# Patient Record
Sex: FEMALE | Race: WHITE | NOT HISPANIC OR LATINO | Employment: PART TIME | URBAN - METROPOLITAN AREA
[De-identification: names, ages, dates, MRNs, and addresses within clinical notes are randomized per-mention and may not be internally consistent; named-entity substitution may affect disease eponyms.]

---

## 2018-09-18 LAB
EXTERNAL HEPATITIS B SURFACE ANTIGEN: NEGATIVE
EXTERNAL SYPHILIS RPR SCREEN: NORMAL
HIV1 P24 AG SERPL QL IA: NEGATIVE

## 2018-10-03 LAB — EXTERNAL GROUP B STREP ANTIGEN: NORMAL

## 2019-02-07 ENCOUNTER — HOSPITAL ENCOUNTER (EMERGENCY)
Facility: HOSPITAL | Age: 29
Discharge: HOME | End: 2019-02-07
Attending: EMERGENCY MEDICINE | Admitting: EMERGENCY MEDICINE

## 2019-02-07 ENCOUNTER — APPOINTMENT (OUTPATIENT)
Dept: GENERAL RADIOLOGY | Facility: HOSPITAL | Age: 29
End: 2019-02-07

## 2019-02-07 VITALS
TEMPERATURE: 98 F | HEART RATE: 85 BPM | HEIGHT: 64 IN | SYSTOLIC BLOOD PRESSURE: 107 MMHG | OXYGEN SATURATION: 98 % | WEIGHT: 140 LBS | DIASTOLIC BLOOD PRESSURE: 75 MMHG | BODY MASS INDEX: 23.9 KG/M2 | RESPIRATION RATE: 20 BRPM

## 2019-02-07 DIAGNOSIS — R06.02 SHORTNESS OF BREATH: Primary | ICD-10-CM

## 2019-02-07 LAB
ALBUMIN SERPL-MCNC: 4 G/DL (ref 3.5–5.2)
ALBUMIN/GLOB SERPL: 1.3 G/DL
ALP SERPL-CCNC: 53 U/L (ref 39–117)
ALT SERPL W P-5'-P-CCNC: 23 U/L (ref 1–33)
ANION GAP SERPL CALCULATED.3IONS-SCNC: 12.2 MMOL/L
AST SERPL-CCNC: 17 U/L (ref 1–32)
BASOPHILS # BLD AUTO: 0.04 10*3/MM3 (ref 0–0.2)
BASOPHILS NFR BLD AUTO: 0.3 % (ref 0–1.5)
BILIRUB SERPL-MCNC: <0.2 MG/DL (ref 0.1–1.2)
BILIRUB UR QL STRIP: NEGATIVE
BUN BLD-MCNC: 6 MG/DL (ref 6–20)
BUN/CREAT SERPL: 12.8 (ref 7–25)
CALCIUM SPEC-SCNC: 9.2 MG/DL (ref 8.6–10.5)
CHLORIDE SERPL-SCNC: 102 MMOL/L (ref 98–107)
CLARITY UR: CLEAR
CO2 SERPL-SCNC: 23.8 MMOL/L (ref 22–29)
COLOR UR: YELLOW
CREAT BLD-MCNC: 0.47 MG/DL (ref 0.57–1)
D DIMER PPP FEU-MCNC: 0.9 MCGFEU/ML (ref 0–0.49)
DEPRECATED RDW RBC AUTO: 49.7 FL (ref 37–54)
EOSINOPHIL # BLD AUTO: 0.16 10*3/MM3 (ref 0–0.7)
EOSINOPHIL NFR BLD AUTO: 1.1 % (ref 0.3–6.2)
ERYTHROCYTE [DISTWIDTH] IN BLOOD BY AUTOMATED COUNT: 14 % (ref 11.7–13)
GFR SERPL CREATININE-BSD FRML MDRD: >150 ML/MIN/1.73
GLOBULIN UR ELPH-MCNC: 3.2 GM/DL
GLUCOSE BLD-MCNC: 84 MG/DL (ref 65–99)
GLUCOSE UR STRIP-MCNC: NEGATIVE MG/DL
HCT VFR BLD AUTO: 35.6 % (ref 35.6–45.5)
HGB BLD-MCNC: 11.7 G/DL (ref 11.9–15.5)
HGB UR QL STRIP.AUTO: NEGATIVE
IMM GRANULOCYTES # BLD AUTO: 0.11 10*3/MM3 (ref 0–0.03)
IMM GRANULOCYTES NFR BLD AUTO: 0.8 % (ref 0–0.5)
KETONES UR QL STRIP: NEGATIVE
LEUKOCYTE ESTERASE UR QL STRIP.AUTO: NEGATIVE
LYMPHOCYTES # BLD AUTO: 2.27 10*3/MM3 (ref 0.9–4.8)
LYMPHOCYTES NFR BLD AUTO: 15.8 % (ref 19.6–45.3)
MCH RBC QN AUTO: 32.1 PG (ref 26.9–32)
MCHC RBC AUTO-ENTMCNC: 32.9 G/DL (ref 32.4–36.3)
MCV RBC AUTO: 97.5 FL (ref 80.5–98.2)
MONOCYTES # BLD AUTO: 0.81 10*3/MM3 (ref 0.2–1.2)
MONOCYTES NFR BLD AUTO: 5.6 % (ref 5–12)
NEUTROPHILS # BLD AUTO: 10.97 10*3/MM3 (ref 1.9–8.1)
NEUTROPHILS NFR BLD AUTO: 76.4 % (ref 42.7–76)
NITRITE UR QL STRIP: NEGATIVE
PH UR STRIP.AUTO: 7.5 [PH] (ref 5–8)
PLATELET # BLD AUTO: 332 10*3/MM3 (ref 140–500)
PMV BLD AUTO: 10.1 FL (ref 6–12)
POTASSIUM BLD-SCNC: 4 MMOL/L (ref 3.5–5.2)
PROT SERPL-MCNC: 7.2 G/DL (ref 6–8.5)
PROT UR QL STRIP: NEGATIVE
RBC # BLD AUTO: 3.65 10*6/MM3 (ref 3.9–5.2)
SODIUM BLD-SCNC: 138 MMOL/L (ref 136–145)
SP GR UR STRIP: <=1.005 (ref 1–1.03)
UROBILINOGEN UR QL STRIP: NORMAL
WBC NRBC COR # BLD: 14.36 10*3/MM3 (ref 4.5–10.7)

## 2019-02-07 PROCEDURE — 99284 EMERGENCY DEPT VISIT MOD MDM: CPT

## 2019-02-07 PROCEDURE — 85379 FIBRIN DEGRADATION QUANT: CPT | Performed by: NURSE PRACTITIONER

## 2019-02-07 PROCEDURE — 85025 COMPLETE CBC W/AUTO DIFF WBC: CPT | Performed by: NURSE PRACTITIONER

## 2019-02-07 PROCEDURE — 81003 URINALYSIS AUTO W/O SCOPE: CPT | Performed by: NURSE PRACTITIONER

## 2019-02-07 PROCEDURE — 93005 ELECTROCARDIOGRAM TRACING: CPT | Performed by: EMERGENCY MEDICINE

## 2019-02-07 PROCEDURE — 80053 COMPREHEN METABOLIC PANEL: CPT | Performed by: NURSE PRACTITIONER

## 2019-02-07 PROCEDURE — 71045 X-RAY EXAM CHEST 1 VIEW: CPT

## 2019-02-07 NOTE — ED PROVIDER NOTES
EMERGENCY DEPARTMENT ENCOUNTER    CHIEF COMPLAINT  Chief Complaint: SOA  History given by:Pt  History limited by:None  Time Seen: 4:05 PM  Room Number: 43/43  PMD: Provider, No Known  OB/GYN Dr. Jones    HPI:  Pt is a 29 y.o. female who is 26 weeks pregnant (A0) presents with worsening SOA for 2 week. Pt c/o feeling like she can't get a deep breath. She also c/o mild cough but denies CP, fever, nausea, vomiting, recent travel, leg swelling/pain, and previous complications during her pregnancy. Pt contacted her OB Dr. Jones and they advised her to come to the ED for further evaluation.     Duration: 2 week  Timing:constant   Location:respiratory  Quality:short of breath  Intensity/Severity:moderate  Progression:worsening  Associated Symptoms:mild coug  Previous Episodes: none  Treatment before arrival:none    PAST MEDICAL HISTORY  Active Ambulatory Problems     Diagnosis Date Noted   • Bilateral hip pain 2016     Resolved Ambulatory Problems     Diagnosis Date Noted   • No Resolved Ambulatory Problems     Past Medical History:   Diagnosis Date   • Heart murmur    • Irritable bowel syndrome    • Seasonal allergies        PAST SURGICAL HISTORY  Past Surgical History:   Procedure Laterality Date   • KNEE ARTHROSCOPY         FAMILY HISTORY  Family History   Problem Relation Age of Onset   • Hypertension Mother    • Diabetes Mother    • Lung disease Mother    • Heart disease Mother    • Cancer Paternal Uncle    • Clotting disorder Maternal Grandmother    • Cancer Maternal Grandfather    • Hypertension Maternal Grandfather    • Diabetes Paternal Grandmother    • Lung disease Paternal Grandmother    • Heart disease Paternal Grandmother    • Cancer Paternal Grandfather    • Hypertension Paternal Grandfather        SOCIAL HISTORY  Social History     Socioeconomic History   • Marital status:      Spouse name: Not on file   • Number of children: Not on file   • Years of education: Not on file   • Highest  education level: Not on file   Social Needs   • Financial resource strain: Not on file   • Food insecurity - worry: Not on file   • Food insecurity - inability: Not on file   • Transportation needs - medical: Not on file   • Transportation needs - non-medical: Not on file   Occupational History   • Not on file   Tobacco Use   • Smoking status: Never Smoker   • Smokeless tobacco: Never Used   Substance and Sexual Activity   • Alcohol use: Yes     Comment: social   • Drug use: No   • Sexual activity: Not on file   Other Topics Concern   • Not on file   Social History Narrative   • Not on file         ALLERGIES  Codeine; Latex; Levaquin [levofloxacin]; and Sulfa antibiotics    REVIEW OF SYSTEMS  Review of Systems   Constitutional: Negative.  Negative for activity change, appetite change ( decreased), chills, fatigue and fever.   HENT: Negative.  Negative for congestion, ear pain, rhinorrhea and sore throat.    Eyes: Negative.    Respiratory: Positive for cough (mild) and shortness of breath.    Cardiovascular: Negative.  Negative for chest pain, palpitations and leg swelling ( pedal).   Gastrointestinal: Negative.  Negative for abdominal pain, constipation, diarrhea, nausea and vomiting.   Endocrine: Negative.    Genitourinary: Negative.  Negative for decreased urine volume, difficulty urinating, dysuria, menstrual problem, pelvic pain, urgency and vaginal discharge.   Musculoskeletal: Negative.  Negative for back pain.   Skin: Negative.  Negative for rash.   Allergic/Immunologic: Negative.    Neurological: Negative.  Negative for dizziness, weakness, light-headedness, numbness and headaches.   Hematological: Negative.    Psychiatric/Behavioral: Negative.  The patient is not nervous/anxious.    All other systems reviewed and are negative.      PHYSICAL EXAM  ED Triage Vitals [02/07/19 1542]   Temp Heart Rate Resp BP SpO2   98.4 °F (36.9 °C) 118 22 130/84 96 %      Temp src Heart Rate Source Patient Position BP Location  FiO2 (%)   -- -- -- -- --       Physical Exam   Constitutional: She is well-developed, well-nourished, and in no distress. No distress.   HENT:   Head: Normocephalic and atraumatic.   Mouth/Throat: Oropharynx is clear and moist and mucous membranes are normal.   Eyes: Pupils are equal, round, and reactive to light.   Neck: Normal range of motion.   Cardiovascular: Regular rhythm and normal heart sounds. Tachycardia present.   Pulmonary/Chest: Effort normal and breath sounds normal. She has no wheezes.   Abdominal: Soft. Bowel sounds are normal. There is no tenderness.   Musculoskeletal: Normal range of motion. She exhibits no edema.   Neurological: She is alert.   Skin: Skin is warm and dry. No rash noted.   Psychiatric: Mood, memory, affect and judgment normal.   Nursing note and vitals reviewed.      LAB RESULTS  Recent Results (from the past 24 hour(s))   Comprehensive Metabolic Panel    Collection Time: 02/07/19  4:59 PM   Result Value Ref Range    Glucose 84 65 - 99 mg/dL    BUN 6 6 - 20 mg/dL    Creatinine 0.47 (L) 0.57 - 1.00 mg/dL    Sodium 138 136 - 145 mmol/L    Potassium 4.0 3.5 - 5.2 mmol/L    Chloride 102 98 - 107 mmol/L    CO2 23.8 22.0 - 29.0 mmol/L    Calcium 9.2 8.6 - 10.5 mg/dL    Total Protein 7.2 6.0 - 8.5 g/dL    Albumin 4.00 3.50 - 5.20 g/dL    ALT (SGPT) 23 1 - 33 U/L    AST (SGOT) 17 1 - 32 U/L    Alkaline Phosphatase 53 39 - 117 U/L    Total Bilirubin <0.2 0.1 - 1.2 mg/dL    eGFR Non African Amer >150 >60 mL/min/1.73    Globulin 3.2 gm/dL    A/G Ratio 1.3 g/dL    BUN/Creatinine Ratio 12.8 7.0 - 25.0    Anion Gap 12.2 mmol/L   D-dimer, Quantitative    Collection Time: 02/07/19  4:59 PM   Result Value Ref Range    D-Dimer, Quantitative 0.90 (H) 0.00 - 0.49 MCGFEU/mL   CBC Auto Differential    Collection Time: 02/07/19  4:59 PM   Result Value Ref Range    WBC 14.36 (H) 4.50 - 10.70 10*3/mm3    RBC 3.65 (L) 3.90 - 5.20 10*6/mm3    Hemoglobin 11.7 (L) 11.9 - 15.5 g/dL    Hematocrit 35.6 35.6 -  45.5 %    MCV 97.5 80.5 - 98.2 fL    MCH 32.1 (H) 26.9 - 32.0 pg    MCHC 32.9 32.4 - 36.3 g/dL    RDW 14.0 (H) 11.7 - 13.0 %    RDW-SD 49.7 37.0 - 54.0 fl    MPV 10.1 6.0 - 12.0 fL    Platelets 332 140 - 500 10*3/mm3    Neutrophil % 76.4 (H) 42.7 - 76.0 %    Lymphocyte % 15.8 (L) 19.6 - 45.3 %    Monocyte % 5.6 5.0 - 12.0 %    Eosinophil % 1.1 0.3 - 6.2 %    Basophil % 0.3 0.0 - 1.5 %    Immature Grans % 0.8 (H) 0.0 - 0.5 %    Neutrophils, Absolute 10.97 (H) 1.90 - 8.10 10*3/mm3    Lymphocytes, Absolute 2.27 0.90 - 4.80 10*3/mm3    Monocytes, Absolute 0.81 0.20 - 1.20 10*3/mm3    Eosinophils, Absolute 0.16 0.00 - 0.70 10*3/mm3    Basophils, Absolute 0.04 0.00 - 0.20 10*3/mm3    Immature Grans, Absolute 0.11 (H) 0.00 - 0.03 10*3/mm3   Urinalysis With Microscopic If Indicated (No Culture) - Urine, Clean Catch    Collection Time: 02/07/19  5:08 PM   Result Value Ref Range    Color, UA Yellow Yellow, Straw    Appearance, UA Clear Clear    pH, UA 7.5 5.0 - 8.0    Specific Gravity, UA <=1.005 1.005 - 1.030    Glucose, UA Negative Negative    Ketones, UA Negative Negative    Bilirubin, UA Negative Negative    Blood, UA Negative Negative    Protein, UA Negative Negative    Leuk Esterase, UA Negative Negative    Nitrite, UA Negative Negative    Urobilinogen, UA 0.2 E.U./dL 0.2 - 1.0 E.U./dL       I ordered the above labs and reviewed the results    RADIOLOGY  XR Chest 1 View   Preliminary Result   No acute process.              I ordered the above noted radiological studies and reviewed the images on the PACS system.        EKG    ekg was interpreted by Dr. Margarita GAINES AND CONSULTS  6:07 PM  Consult placed to OB/GYN  Reviewed pt's history and workup with Dr. Avila.  After a bedside evaluation; Dr Avila agrees with the plan of care.    6:25 PM  Discussed pt case with Dr. Brown, OB/GYN. She would like L&D to run a non stress test strip on the baby. If that is normal pt can be discharged and f/u with OB.     7:17  "PM  L&D reports exam is normal and fetus has normal heart tones.     7:22 PM  Rechecked pt who is resting in NAD. Informed pt of normal CXR and labs. Pt will be discharged and f/u with OB. Pt understands and agrees with the plan, all questions answered.    COURSE & MEDICAL DECISION MAKING  Pertinent Labs and Imaging studies that were ordered and reviewed are noted above.  Results were reviewed/discussed with the patient and they were also made aware of online assess.   Pt also made aware that some labs, such as cultures, will not be resulted during ER visit and follow up with PMD is necessary.     MEDICATIONS GIVEN IN ER  Medications - No data to display    /75   Pulse 85   Temp 98 °F (36.7 °C)   Resp 20   Ht 162.6 cm (64\")   Wt 63.5 kg (140 lb)   SpO2 98%   BMI 24.03 kg/m²     Discussed all results and noted any abnormalities with patient.  Discussed absoute need to recheck abnormalities with OB    Reviewed implications of results, diagnosis, meds, responsibility to follow up, warning signs and symptoms of possible worsening, potential complications and reasons to return to ER with patient    Discussed plan for discharge, as there is no emergent indication for admission.  Pt is agreeable and understands need for follow up and repeat testing.  Pt is aware that discharge does not mean that nothing is wrong but it indicates no emergency is present and they must continue care with OB.  Pt is discharged with instructions to follow up with primary care doctor to have their blood pressure rechecked.       DIAGNOSIS  Final diagnoses:   Shortness of breath       FOLLOW UP   Jessica Jones MD  [address] [id number] [phone]    Schedule an appointment as soon as possible for a visit         RX     Medication List      Stop    ibuprofen 200 MG tablet  Commonly known as:  ADVIL,MOTRIN          I personally reviewed the past medical history, past surgical history, social history, family " history, current medications and allergies as they appear in this chart.  The scribe's note accurately reflects the work and decisions made by me.         Documentation assistance provided by fabiola Ace for GEORGIA Keller.  Information recorded by the scribe was done at my direction and has been verified and validated by me.         Denia Ace  02/07/19 1925       Hilda Sharma APRN  02/07/19 1942

## 2019-02-07 NOTE — ED PROVIDER NOTES
MD ATTESTATION NOTE    The IKE and I have discussed this patient's history, physical exam, and treatment plan.  I have reviewed the documentation and personally had a face to face interaction with the patient. I affirm the documentation and agree with the treatment and plan.  The attached note describes my personal findings.    The patient presents complaining of SOA for 2 weeks, and worse over the last few days. The pt states that it is worse with walking, and she feels like she cannot get a deep breathing. She is approximately 26 weeks pregnant. The pt confirms subjective fever, chills, and chest tightness, and denies leg swelling, leg pain, or long distance travel. Discussed the plan to wait on CXR and lab results. The pt agrees with the plan.     PEx:  Heart RRR, 2/6 systolic ejection murmur on L sternal border  Lungs CTAB  Abd gravid, bowel sounds normal, soft, nontender  Ext - No pedal edema or calf tenderness.  No homans sign.     Documentation assistance provided by fabiola Corona for Dr. Avila.  Information recorded by the scribe was done at my direction and has been verified and validated by me.             Marnie Corona  02/07/19 8316       Jared Avila MD  02/07/19 6243

## 2019-02-08 NOTE — NURSING NOTE
"Patient in ER for complaints of chest tightness and SOA.   States, is 26 wks gestation and EDC  In 05/16/2019.  Denies contractions, vaginal bleeding or rupture of membranes.  Uterus soft to palpation.   States, \"baby has been active today\".  Fetal monitors explained and applied.   Fetal heart rate 150's and variability appropriate for gestational age.  No decelerations noted.  States, is 26 wks gestation and EDC  In 05/16/2019.  "

## 2019-03-25 ENCOUNTER — HOSPITAL ENCOUNTER (OUTPATIENT)
Facility: HOSPITAL | Age: 29
Setting detail: OBSERVATION
Discharge: HOME | End: 2019-03-25
Attending: OBSTETRICS & GYNECOLOGY | Admitting: OBSTETRICS & GYNECOLOGY

## 2019-03-25 VITALS
HEART RATE: 114 BPM | BODY MASS INDEX: 29.78 KG/M2 | TEMPERATURE: 97.9 F | WEIGHT: 174.4 LBS | RESPIRATION RATE: 18 BRPM | HEIGHT: 64 IN | SYSTOLIC BLOOD PRESSURE: 123 MMHG | DIASTOLIC BLOOD PRESSURE: 64 MMHG

## 2019-03-25 PROBLEM — Z34.90 PREGNANCY: Status: ACTIVE | Noted: 2019-03-25

## 2019-03-25 LAB
BILIRUB UR QL STRIP: NEGATIVE
CLARITY UR: CLEAR
COLOR UR: YELLOW
GLUCOSE UR STRIP-MCNC: NEGATIVE MG/DL
HGB UR QL STRIP.AUTO: NEGATIVE
KETONES UR QL STRIP: NEGATIVE
LEUKOCYTE ESTERASE UR QL STRIP.AUTO: NEGATIVE
NITRITE UR QL STRIP: NEGATIVE
PH UR STRIP.AUTO: 6.5 [PH] (ref 5–8)
PROT UR QL STRIP: NEGATIVE
SP GR UR STRIP: 1.01 (ref 1–1.03)
UROBILINOGEN UR QL STRIP: NORMAL

## 2019-03-25 PROCEDURE — 59025 FETAL NON-STRESS TEST: CPT

## 2019-03-25 PROCEDURE — 87086 URINE CULTURE/COLONY COUNT: CPT | Performed by: OBSTETRICS & GYNECOLOGY

## 2019-03-25 PROCEDURE — 81003 URINALYSIS AUTO W/O SCOPE: CPT | Performed by: OBSTETRICS & GYNECOLOGY

## 2019-03-25 PROCEDURE — G0378 HOSPITAL OBSERVATION PER HR: HCPCS

## 2019-03-25 PROCEDURE — 96361 HYDRATE IV INFUSION ADD-ON: CPT

## 2019-03-25 PROCEDURE — 96360 HYDRATION IV INFUSION INIT: CPT

## 2019-03-25 PROCEDURE — 25010000002 TERBUTALINE PER 1 MG: Performed by: OBSTETRICS & GYNECOLOGY

## 2019-03-25 PROCEDURE — 96372 THER/PROPH/DIAG INJ SC/IM: CPT

## 2019-03-25 RX ORDER — FERROUS SULFATE 325(65) MG
325 TABLET ORAL EVERY OTHER DAY
COMMUNITY
End: 2020-08-03

## 2019-03-25 RX ORDER — SODIUM CHLORIDE, SODIUM LACTATE, POTASSIUM CHLORIDE, CALCIUM CHLORIDE 600; 310; 30; 20 MG/100ML; MG/100ML; MG/100ML; MG/100ML
125 INJECTION, SOLUTION INTRAVENOUS CONTINUOUS
Status: DISCONTINUED | OUTPATIENT
Start: 2019-03-25 | End: 2019-03-26 | Stop reason: HOSPADM

## 2019-03-25 RX ORDER — NIFEDIPINE 10 MG/1
20 CAPSULE ORAL ONCE
Status: COMPLETED | OUTPATIENT
Start: 2019-03-25 | End: 2019-03-25

## 2019-03-25 RX ORDER — PRENATAL VIT NO.126/IRON/FOLIC 28MG-0.8MG
1 TABLET ORAL DAILY
COMMUNITY
End: 2019-12-16

## 2019-03-25 RX ORDER — TERBUTALINE SULFATE 1 MG/ML
0.25 INJECTION, SOLUTION SUBCUTANEOUS ONCE
Status: COMPLETED | OUTPATIENT
Start: 2019-03-25 | End: 2019-03-25

## 2019-03-25 RX ORDER — DOCUSATE SODIUM 100 MG/1
100 CAPSULE, LIQUID FILLED ORAL
COMMUNITY
End: 2019-05-11 | Stop reason: HOSPADM

## 2019-03-25 RX ORDER — SODIUM CHLORIDE 0.9 % (FLUSH) 0.9 %
5 SYRINGE (ML) INJECTION AS NEEDED
Status: DISCONTINUED | OUTPATIENT
Start: 2019-03-25 | End: 2019-03-26 | Stop reason: HOSPADM

## 2019-03-25 RX ORDER — SODIUM CHLORIDE 0.9 % (FLUSH) 0.9 %
3 SYRINGE (ML) INJECTION EVERY 12 HOURS SCHEDULED
Status: DISCONTINUED | OUTPATIENT
Start: 2019-03-25 | End: 2019-03-26 | Stop reason: HOSPADM

## 2019-03-25 RX ADMIN — SODIUM CHLORIDE, POTASSIUM CHLORIDE, SODIUM LACTATE AND CALCIUM CHLORIDE 125 ML/HR: 600; 310; 30; 20 INJECTION, SOLUTION INTRAVENOUS at 21:28

## 2019-03-25 RX ADMIN — NIFEDIPINE 20 MG: 10 CAPSULE ORAL at 19:15

## 2019-03-25 RX ADMIN — SODIUM CHLORIDE, POTASSIUM CHLORIDE, SODIUM LACTATE AND CALCIUM CHLORIDE 1000 ML: 600; 310; 30; 20 INJECTION, SOLUTION INTRAVENOUS at 20:18

## 2019-03-25 RX ADMIN — TERBUTALINE SULFATE 0.25 MG: 1 INJECTION SUBCUTANEOUS at 21:26

## 2019-03-25 NOTE — PROGRESS NOTES
Pt presents at 32w4d with concern for ROM and contractions.  Just moister clothing than usual but back pain.      FHT 140s with good variability. irritability    Spec exam  No pool, neg nitrazine, neg fern    Cx closed/20/-2    A/P   No evidence of ROM           Uterine activity noted- will check UA           Oral hydration    Renata Mays MD  03/25/19  6:56 PM

## 2019-03-26 LAB — BACTERIA SPEC AEROBE CULT: NO GROWTH

## 2019-03-26 NOTE — NON STRESS TEST
Farheen Chadwick, a  at 32w4d with an NATASHA of 2019, Date entered prior to episode creation, was seen at [facility] for a nonstress test.    Chief Complaint   Patient presents with   • Leaking Fluid     Pt states that she had a small leakage of fluid around 0845 and then wore a panty liner throughout the day and had to change it more than normal. She also complains of some slight cramping and tightening about 4-6 times a hour but seems to be better right now; She is also noticing lower sharp back pain that's constant but worse with tightening. Good fm       Patient Active Problem List   Diagnosis   • Bilateral hip pain   • Pregnancy       Start Time:   Stop Time:     Interpretation A  Nonstress Test Interpretation A: Reactive (19 7581 : Mello Skaggs, RN)        NST reviewed and is reactive. Pt. Did stop having regular contractions after procardia, 1L bolus of LR and continuous fluids and then ultimately 0.25mg of Terbutaline was given and stopped contractions. Pt. Is ok with going home to get rest and pt. Given  labor warning signs. Pt. Given ways to help relax at home and when to come back to the hospital.

## 2019-04-29 ENCOUNTER — HOSPITAL ENCOUNTER (OUTPATIENT)
Dept: LABOR AND DELIVERY | Facility: HOSPITAL | Age: 29
Discharge: HOME | End: 2019-04-29
Attending: OBSTETRICS & GYNECOLOGY | Admitting: OBSTETRICS & GYNECOLOGY

## 2019-04-29 VITALS
DIASTOLIC BLOOD PRESSURE: 80 MMHG | RESPIRATION RATE: 16 BRPM | SYSTOLIC BLOOD PRESSURE: 123 MMHG | BODY MASS INDEX: 30.73 KG/M2 | OXYGEN SATURATION: 97 % | TEMPERATURE: 97.8 F | HEART RATE: 96 BPM | WEIGHT: 180 LBS | HEIGHT: 64 IN

## 2019-04-29 PROCEDURE — 59412 ANTEPARTUM MANIPULATION: CPT

## 2019-04-29 PROCEDURE — 25010000002 TERBUTALINE PER 1 MG: Performed by: OBSTETRICS & GYNECOLOGY

## 2019-04-29 PROCEDURE — G0378 HOSPITAL OBSERVATION PER HR: HCPCS

## 2019-04-29 PROCEDURE — 96374 THER/PROPH/DIAG INJ IV PUSH: CPT

## 2019-04-29 PROCEDURE — 25010000002 BUTORPHANOL PER 1 MG

## 2019-04-29 PROCEDURE — 96372 THER/PROPH/DIAG INJ SC/IM: CPT

## 2019-04-29 PROCEDURE — 96361 HYDRATE IV INFUSION ADD-ON: CPT

## 2019-04-29 RX ORDER — BUTORPHANOL TARTRATE 1 MG/ML
1 INJECTION, SOLUTION INTRAMUSCULAR; INTRAVENOUS ONCE
Status: DISCONTINUED | OUTPATIENT
Start: 2019-04-29 | End: 2019-04-29 | Stop reason: HOSPADM

## 2019-04-29 RX ORDER — SODIUM CHLORIDE, SODIUM LACTATE, POTASSIUM CHLORIDE, CALCIUM CHLORIDE 600; 310; 30; 20 MG/100ML; MG/100ML; MG/100ML; MG/100ML
125 INJECTION, SOLUTION INTRAVENOUS CONTINUOUS
Status: DISCONTINUED | OUTPATIENT
Start: 2019-04-29 | End: 2019-04-29 | Stop reason: HOSPADM

## 2019-04-29 RX ORDER — PHENOL 1.4 %
600 AEROSOL, SPRAY (ML) MUCOUS MEMBRANE DAILY
COMMUNITY
End: 2019-05-11 | Stop reason: HOSPADM

## 2019-04-29 RX ORDER — TERBUTALINE SULFATE 1 MG/ML
0.25 INJECTION, SOLUTION SUBCUTANEOUS ONCE
Status: COMPLETED | OUTPATIENT
Start: 2019-04-29 | End: 2019-04-29

## 2019-04-29 RX ADMIN — SODIUM CHLORIDE, POTASSIUM CHLORIDE, SODIUM LACTATE AND CALCIUM CHLORIDE 125 ML/HR: 600; 310; 30; 20 INJECTION, SOLUTION INTRAVENOUS at 08:15

## 2019-04-29 RX ADMIN — BUTORPHANOL TARTRATE 1 MG: 2 INJECTION, SOLUTION INTRAMUSCULAR; INTRAVENOUS at 11:02

## 2019-04-29 RX ADMIN — TERBUTALINE SULFATE 0.25 MG: 1 INJECTION, SOLUTION SUBCUTANEOUS at 10:58

## 2019-05-08 ENCOUNTER — HOSPITAL ENCOUNTER (INPATIENT)
Facility: HOSPITAL | Age: 29
LOS: 3 days | Discharge: HOME | End: 2019-05-11
Attending: OBSTETRICS & GYNECOLOGY | Admitting: OBSTETRICS & GYNECOLOGY

## 2019-05-08 ENCOUNTER — ANESTHESIA EVENT (OUTPATIENT)
Dept: LABOR AND DELIVERY | Facility: HOSPITAL | Age: 29
End: 2019-05-08

## 2019-05-08 ENCOUNTER — ANESTHESIA (OUTPATIENT)
Dept: LABOR AND DELIVERY | Facility: HOSPITAL | Age: 29
End: 2019-05-08

## 2019-05-08 LAB
ABO GROUP BLD: NORMAL
BASOPHILS # BLD AUTO: 0.05 10*3/MM3 (ref 0–0.2)
BASOPHILS NFR BLD AUTO: 0.4 % (ref 0–1.5)
BLD GP AB SCN SERPL QL: NEGATIVE
DEPRECATED RDW RBC AUTO: 47.8 FL (ref 37–54)
EOSINOPHIL # BLD AUTO: 0.41 10*3/MM3 (ref 0–0.4)
EOSINOPHIL NFR BLD AUTO: 3.2 % (ref 0.3–6.2)
ERYTHROCYTE [DISTWIDTH] IN BLOOD BY AUTOMATED COUNT: 14.1 % (ref 12.3–15.4)
HCT VFR BLD AUTO: 35.7 % (ref 34–46.6)
HGB BLD-MCNC: 12 G/DL (ref 12–15.9)
IMM GRANULOCYTES # BLD AUTO: 0.21 10*3/MM3 (ref 0–0.05)
IMM GRANULOCYTES NFR BLD AUTO: 1.6 % (ref 0–0.5)
LYMPHOCYTES # BLD AUTO: 1.91 10*3/MM3 (ref 0.7–3.1)
LYMPHOCYTES NFR BLD AUTO: 14.9 % (ref 19.6–45.3)
MCH RBC QN AUTO: 31.3 PG (ref 26.6–33)
MCHC RBC AUTO-ENTMCNC: 33.6 G/DL (ref 31.5–35.7)
MCV RBC AUTO: 93 FL (ref 79–97)
MONOCYTES # BLD AUTO: 0.8 10*3/MM3 (ref 0.1–0.9)
MONOCYTES NFR BLD AUTO: 6.3 % (ref 5–12)
NEUTROPHILS # BLD AUTO: 9.41 10*3/MM3 (ref 1.7–7)
NEUTROPHILS NFR BLD AUTO: 73.6 % (ref 42.7–76)
NRBC BLD AUTO-RTO: 0 /100 WBC (ref 0–0.2)
PLATELET # BLD AUTO: 267 10*3/MM3 (ref 140–450)
PMV BLD AUTO: 11 FL (ref 6–12)
RBC # BLD AUTO: 3.84 10*6/MM3 (ref 3.77–5.28)
RH BLD: POSITIVE
T&S EXPIRATION DATE: NORMAL
WBC NRBC COR # BLD: 12.79 10*3/MM3 (ref 3.4–10.8)

## 2019-05-08 PROCEDURE — 25010000002 ONDANSETRON PER 1 MG: Performed by: ANESTHESIOLOGY

## 2019-05-08 PROCEDURE — 25010000002 FENTANYL CITRATE (PF) 100 MCG/2ML SOLUTION: Performed by: NURSE ANESTHETIST, CERTIFIED REGISTERED

## 2019-05-08 PROCEDURE — 86900 BLOOD TYPING SEROLOGIC ABO: CPT | Performed by: OBSTETRICS & GYNECOLOGY

## 2019-05-08 PROCEDURE — 86901 BLOOD TYPING SEROLOGIC RH(D): CPT | Performed by: OBSTETRICS & GYNECOLOGY

## 2019-05-08 PROCEDURE — 85025 COMPLETE CBC W/AUTO DIFF WBC: CPT | Performed by: OBSTETRICS & GYNECOLOGY

## 2019-05-08 PROCEDURE — 25010000002 MORPHINE PER 10 MG: Performed by: NURSE ANESTHETIST, CERTIFIED REGISTERED

## 2019-05-08 PROCEDURE — 86850 RBC ANTIBODY SCREEN: CPT | Performed by: OBSTETRICS & GYNECOLOGY

## 2019-05-08 PROCEDURE — 25010000003 CEFAZOLIN IN DEXTROSE 2-4 GM/100ML-% SOLUTION: Performed by: OBSTETRICS & GYNECOLOGY

## 2019-05-08 RX ORDER — ONDANSETRON 2 MG/ML
4 INJECTION INTRAMUSCULAR; INTRAVENOUS ONCE AS NEEDED
Status: COMPLETED | OUTPATIENT
Start: 2019-05-08 | End: 2019-05-08

## 2019-05-08 RX ORDER — DIPHENHYDRAMINE HYDROCHLORIDE 50 MG/ML
25 INJECTION INTRAMUSCULAR; INTRAVENOUS EVERY 4 HOURS PRN
Status: DISCONTINUED | OUTPATIENT
Start: 2019-05-08 | End: 2019-05-11 | Stop reason: HOSPADM

## 2019-05-08 RX ORDER — PRENATAL VIT NO.126/IRON/FOLIC 28MG-0.8MG
1 TABLET ORAL DAILY
Status: DISCONTINUED | OUTPATIENT
Start: 2019-05-08 | End: 2019-05-11 | Stop reason: HOSPADM

## 2019-05-08 RX ORDER — NALOXONE HCL 0.4 MG/ML
0.2 VIAL (ML) INJECTION
Status: DISCONTINUED | OUTPATIENT
Start: 2019-05-08 | End: 2019-05-11 | Stop reason: HOSPADM

## 2019-05-08 RX ORDER — ONDANSETRON 4 MG/1
4 TABLET, FILM COATED ORAL EVERY 8 HOURS PRN
Status: DISCONTINUED | OUTPATIENT
Start: 2019-05-08 | End: 2019-05-11 | Stop reason: HOSPADM

## 2019-05-08 RX ORDER — OXYTOCIN-SODIUM CHLORIDE 0.9% IV SOLN 30 UNIT/500ML 30-0.9/5 UT/ML-%
999 SOLUTION INTRAVENOUS ONCE
Status: COMPLETED | OUTPATIENT
Start: 2019-05-08 | End: 2019-05-08

## 2019-05-08 RX ORDER — CARBOPROST TROMETHAMINE 250 UG/ML
250 INJECTION, SOLUTION INTRAMUSCULAR AS NEEDED
Status: DISCONTINUED | OUTPATIENT
Start: 2019-05-08 | End: 2019-05-08

## 2019-05-08 RX ORDER — SODIUM CHLORIDE 0.9 % (FLUSH) 0.9 %
3 SYRINGE (ML) INJECTION EVERY 12 HOURS SCHEDULED
Status: DISCONTINUED | OUTPATIENT
Start: 2019-05-08 | End: 2019-05-08

## 2019-05-08 RX ORDER — METHYLERGONOVINE MALEATE 0.2 MG/ML
200 INJECTION INTRAVENOUS ONCE AS NEEDED
Status: DISCONTINUED | OUTPATIENT
Start: 2019-05-08 | End: 2019-05-08

## 2019-05-08 RX ORDER — ONDANSETRON 2 MG/ML
4 INJECTION INTRAMUSCULAR; INTRAVENOUS ONCE AS NEEDED
Status: DISCONTINUED | OUTPATIENT
Start: 2019-05-08 | End: 2019-05-11 | Stop reason: HOSPADM

## 2019-05-08 RX ORDER — MISOPROSTOL 200 UG/1
800 TABLET ORAL AS NEEDED
Status: DISCONTINUED | OUTPATIENT
Start: 2019-05-08 | End: 2019-05-08

## 2019-05-08 RX ORDER — SIMETHICONE 80 MG
80 TABLET,CHEWABLE ORAL 4 TIMES DAILY PRN
Status: DISCONTINUED | OUTPATIENT
Start: 2019-05-08 | End: 2019-05-11 | Stop reason: HOSPADM

## 2019-05-08 RX ORDER — FENTANYL CITRATE 50 UG/ML
INJECTION, SOLUTION INTRAMUSCULAR; INTRAVENOUS
Status: COMPLETED | OUTPATIENT
Start: 2019-05-08 | End: 2019-05-08

## 2019-05-08 RX ORDER — DOCUSATE SODIUM 100 MG/1
100 CAPSULE, LIQUID FILLED ORAL
Status: COMPLETED | OUTPATIENT
Start: 2019-05-08 | End: 2019-05-08

## 2019-05-08 RX ORDER — BUPIVACAINE HYDROCHLORIDE 7.5 MG/ML
INJECTION, SOLUTION EPIDURAL; RETROBULBAR
Status: COMPLETED | OUTPATIENT
Start: 2019-05-08 | End: 2019-05-08

## 2019-05-08 RX ORDER — LANOLIN
CREAM (ML) TOPICAL
Status: DISCONTINUED | OUTPATIENT
Start: 2019-05-08 | End: 2019-05-11 | Stop reason: HOSPADM

## 2019-05-08 RX ORDER — PHYTONADIONE 2 MG/ML
INJECTION, EMULSION INTRAMUSCULAR; INTRAVENOUS; SUBCUTANEOUS
Status: DISPENSED
Start: 2019-05-08 | End: 2019-05-09

## 2019-05-08 RX ORDER — DIPHENHYDRAMINE HCL 25 MG
25 CAPSULE ORAL EVERY 4 HOURS PRN
Status: DISCONTINUED | OUTPATIENT
Start: 2019-05-08 | End: 2019-05-11 | Stop reason: HOSPADM

## 2019-05-08 RX ORDER — ERYTHROMYCIN 5 MG/G
OINTMENT OPHTHALMIC
Status: DISPENSED
Start: 2019-05-08 | End: 2019-05-09

## 2019-05-08 RX ORDER — PROMETHAZINE HYDROCHLORIDE 12.5 MG/1
12.5 TABLET ORAL EVERY 4 HOURS PRN
Status: DISCONTINUED | OUTPATIENT
Start: 2019-05-08 | End: 2019-05-11 | Stop reason: HOSPADM

## 2019-05-08 RX ORDER — OXYTOCIN-SODIUM CHLORIDE 0.9% IV SOLN 30 UNIT/500ML 30-0.9/5 UT/ML-%
125 SOLUTION INTRAVENOUS CONTINUOUS PRN
Status: COMPLETED | OUTPATIENT
Start: 2019-05-08 | End: 2019-05-08

## 2019-05-08 RX ORDER — FAMOTIDINE 10 MG/ML
20 INJECTION, SOLUTION INTRAVENOUS ONCE AS NEEDED
Status: COMPLETED | OUTPATIENT
Start: 2019-05-08 | End: 2019-05-08

## 2019-05-08 RX ORDER — MORPHINE SULFATE 1 MG/ML
INJECTION, SOLUTION EPIDURAL; INTRATHECAL; INTRAVENOUS
Status: COMPLETED | OUTPATIENT
Start: 2019-05-08 | End: 2019-05-08

## 2019-05-08 RX ORDER — OXYCODONE HYDROCHLORIDE AND ACETAMINOPHEN 5; 325 MG/1; MG/1
1 TABLET ORAL EVERY 4 HOURS PRN
Status: DISCONTINUED | OUTPATIENT
Start: 2019-05-08 | End: 2019-05-11 | Stop reason: HOSPADM

## 2019-05-08 RX ORDER — LIDOCAINE HYDROCHLORIDE 10 MG/ML
5 INJECTION, SOLUTION EPIDURAL; INFILTRATION; INTRACAUDAL; PERINEURAL AS NEEDED
Status: DISCONTINUED | OUTPATIENT
Start: 2019-05-08 | End: 2019-05-08

## 2019-05-08 RX ORDER — CEFAZOLIN SODIUM 2 G/100ML
2 INJECTION, SOLUTION INTRAVENOUS ONCE
Status: COMPLETED | OUTPATIENT
Start: 2019-05-08 | End: 2019-05-08

## 2019-05-08 RX ORDER — SODIUM CHLORIDE, SODIUM LACTATE, POTASSIUM CHLORIDE, CALCIUM CHLORIDE 600; 310; 30; 20 MG/100ML; MG/100ML; MG/100ML; MG/100ML
125 INJECTION, SOLUTION INTRAVENOUS CONTINUOUS
Status: DISCONTINUED | OUTPATIENT
Start: 2019-05-08 | End: 2019-05-11 | Stop reason: HOSPADM

## 2019-05-08 RX ORDER — RANITIDINE HCL 75 MG
75 TABLET ORAL 2 TIMES DAILY
COMMUNITY
End: 2019-05-11 | Stop reason: HOSPADM

## 2019-05-08 RX ORDER — OXYTOCIN-SODIUM CHLORIDE 0.9% IV SOLN 30 UNIT/500ML 30-0.9/5 UT/ML-%
250 SOLUTION INTRAVENOUS CONTINUOUS
Status: ACTIVE | OUTPATIENT
Start: 2019-05-08 | End: 2019-05-08

## 2019-05-08 RX ORDER — SODIUM CHLORIDE 0.9 % (FLUSH) 0.9 %
3-10 SYRINGE (ML) INJECTION AS NEEDED
Status: DISCONTINUED | OUTPATIENT
Start: 2019-05-08 | End: 2019-05-08

## 2019-05-08 RX ORDER — IBUPROFEN 600 MG/1
600 TABLET ORAL EVERY 8 HOURS PRN
Status: DISCONTINUED | OUTPATIENT
Start: 2019-05-08 | End: 2019-05-11 | Stop reason: HOSPADM

## 2019-05-08 RX ORDER — FENTANYL CITRATE 50 UG/ML
50 INJECTION, SOLUTION INTRAMUSCULAR; INTRAVENOUS
Status: ACTIVE | OUTPATIENT
Start: 2019-05-08 | End: 2019-05-09

## 2019-05-08 RX ORDER — OXYCODONE AND ACETAMINOPHEN 10; 325 MG/1; MG/1
1 TABLET ORAL EVERY 4 HOURS PRN
Status: DISCONTINUED | OUTPATIENT
Start: 2019-05-08 | End: 2019-05-11 | Stop reason: HOSPADM

## 2019-05-08 RX ORDER — ACETAMINOPHEN 500 MG
1000 TABLET ORAL ONCE
Status: COMPLETED | OUTPATIENT
Start: 2019-05-08 | End: 2019-05-08

## 2019-05-08 RX ADMIN — ONDANSETRON 4 MG: 2 INJECTION INTRAMUSCULAR; INTRAVENOUS at 12:07

## 2019-05-08 RX ADMIN — MORPHINE SULFATE 200 MCG: 1 INJECTION EPIDURAL; INTRATHECAL; INTRAVENOUS at 12:46

## 2019-05-08 RX ADMIN — CEFAZOLIN SODIUM 2 G: 2 INJECTION, SOLUTION INTRAVENOUS at 12:19

## 2019-05-08 RX ADMIN — OXYCODONE HYDROCHLORIDE AND ACETAMINOPHEN 1 TABLET: 5; 325 TABLET ORAL at 16:04

## 2019-05-08 RX ADMIN — BUPIVACAINE HYDROCHLORIDE 1.6 ML: 7.5 INJECTION, SOLUTION EPIDURAL; RETROBULBAR at 12:46

## 2019-05-08 RX ADMIN — FENTANYL CITRATE 25 MCG: 50 INJECTION INTRAMUSCULAR; INTRAVENOUS at 13:15

## 2019-05-08 RX ADMIN — DOCUSATE SODIUM 100 MG: 100 CAPSULE, LIQUID FILLED ORAL at 20:04

## 2019-05-08 RX ADMIN — FENTANYL CITRATE 25 MCG: 50 INJECTION INTRAMUSCULAR; INTRAVENOUS at 12:46

## 2019-05-08 RX ADMIN — OXYCODONE HYDROCHLORIDE AND ACETAMINOPHEN 1 TABLET: 5; 325 TABLET ORAL at 20:04

## 2019-05-08 RX ADMIN — SODIUM CHLORIDE, POTASSIUM CHLORIDE, SODIUM LACTATE AND CALCIUM CHLORIDE 1000 ML: 600; 310; 30; 20 INJECTION, SOLUTION INTRAVENOUS at 11:16

## 2019-05-08 RX ADMIN — ACETAMINOPHEN 1000 MG: 500 TABLET, FILM COATED ORAL at 12:07

## 2019-05-08 RX ADMIN — IBUPROFEN 600 MG: 600 TABLET ORAL at 16:04

## 2019-05-08 RX ADMIN — OXYTOCIN 125 ML/HR: 10 INJECTION INTRAVENOUS at 14:28

## 2019-05-08 RX ADMIN — OXYTOCIN 999 ML/HR: 10 INJECTION INTRAVENOUS at 13:07

## 2019-05-08 RX ADMIN — FAMOTIDINE 20 MG: 10 INJECTION, SOLUTION INTRAVENOUS at 12:07

## 2019-05-08 RX ADMIN — SODIUM CHLORIDE, POTASSIUM CHLORIDE, SODIUM LACTATE AND CALCIUM CHLORIDE 125 ML/HR: 600; 310; 30; 20 INJECTION, SOLUTION INTRAVENOUS at 12:18

## 2019-05-08 NOTE — LACTATION NOTE
Assisted with positioning for a comfortable latch.  Pt able to follow verbal instruction well.  She is still uncomfortable from .  Reviewed feeding patterns and how to know infant getting enough.    Lactation Consult Note    Evaluation Completed: 2019 5:33 PM  Patient Name: Farheen Chadwick  :  1990  MRN:  [id number]     REFERRAL  INFORMATION:                          Date of Referral: 19   Person Making Referral: nurse  Maternal Reason for Referral: breastfeeding currently       DELIVERY HISTORY:          Skin to skin initiation date/time: 2019  1:45 PM   Skin to skin end date/time:              MATERNAL ASSESSMENT:  Breast Size Issue: none (19 : Brii Barillas, RN)  Breast Shape: Bilateral:, round (19 : Brii Barillas, RN)  Breast Density: Bilateral:, soft (19 : Brii Barillas, RN)  Areola: Bilateral:, elastic (19 : Brii Barillas, RN)  Nipples: Bilateral:, everted (19 : Brii Barillas, RN)                INFANT ASSESSMENT:  Information for the patient's :  Yudith Chadwick [[id number]]   No past medical history on file.    Feeding Readiness Cues: quiet (19 : Brii Barillas RN)   Feeding Method: breastfeeding (19 : Brii Barillas, RN)   Feeding Tolerance/Success: adequate pause for breath, coordinated suck, coordinated swallow (19 : Brii Barillas, RN)       Satiety Cues: calm after feeding (19 : Brii Barillas, RN)           Feeding Interventions: latch assistance provided (19 : Brii Barillas, RN)   Nutrition Interventions: lactation consult initiated (19 : Brii Barillas, RN)   Additional Documentation: LATCH Score (Group) (19 : Brii Barillas, RN)           Breastfeeding: breastfeeding, bilateral (19 : Brii Barillas, RN)   Infant Positioning: clutch/football (19 : Brii Barillas, RN)   Breastfeeding Time,  Left (min): 5 (19 : Brii Barillas RN)       Effective Latch During Feeding: yes (19 : Brii Barillas RN)   Suck/Swallow Coordination: present (19 : Brii Barillas RN)           Latch: 2-->grasps breast, tongue down, lips flanged, rhythmic sucking (19 : Brii Barillas, RN)   Audible Swallowin-->spontaneous and intermittent (24 hrs old) (19 : Brii Barillas, RN)   Type of Nipple: 2-->everted (after stimulation) (19 : Brii Barillas RN)   Comfort (Breast/Nipple): 2-->soft/nontender (19 : Brii Barillas, RN)   Hold (Positioning): 1-->minimal assist, teach one side, mother does other, staff holds (19 : Brii Barillas RN)   Latch Score: 9 (19 : Brii Barillas RN)     Infant-Driven Feeding Scales - Readiness: Alert or fussy prior to care. Rooting and/or hands to mouth behavior. Good tone. (19 : Brii Barillas RN)   Infant-Driven Feeding Scales - Quality: Nipples with a strong coordinated SSB throughout feed. (19 : Brii Barillas, ALESHIA)             MATERNAL INFANT FEEDING:  Maternal Preparation: breast care (19 : Brii Barillas RN)  Maternal Emotional State: relaxed (19 : Brii Barillas, RN)  Infant Positioning: clutch/football (19 : Brii Barillas, RN)   Signs of Milk Transfer: infant jaw motion present (19 : Brii Barillas RN)  Pain with Feeding: no (19 : Brii Barillas, RN)           Milk Ejection Reflex: present (19 : Brii Barillas, ALESHIA)           Latch Assistance: yes (19 : Brii Barillas RN)                               EQUIPMENT TYPE:                                 BREAST PUMPING:          LACTATION REFERRALS:

## 2019-05-08 NOTE — H&P
[facility]  Obstetric History and Physical    Patient Name: Farheen Chadwick  :  1990  MRN:  [id number]      Chief Complaint   Patient presents with   • Scheduled      Transverse/ Breech lie       Subjective     Patient is a 29 y.o. female  currently at 38w6d, who presents for primary  delivery due to breech presentation and borderline low fluid.       Her prenatal care has been uncomplicated      Objective       Vital Signs Range for the last 24 hours  Temperature: Temp:  [97.4 °F (36.3 °C)] 97.4 °F (36.3 °C)   Temp Source: Temp src: Oral   BP: BP: (139-141)/(89-92) 139/92   Pulse: Heart Rate:  [] 109   Respirations: Resp:  [16] 16                   Physical Examination:     General :  Alert in NAD  Abdomen: Gravid, EFW 8.5lbs by leopolds    Presentation: breech       Fetal Heart Rate Assessment reactive, cat I    Uterine Assessment irritable      Results from last 7 days   Lab Units 19  1117   WBC 10*3/mm3 12.79*   HEMOGLOBIN g/dL 12.0   HEMATOCRIT % 35.7   PLATELETS 10*3/mm3 267       Assessment/Plan     1.  Intrauterine pregnancy at 38w6d weeks gestation for primary c/s.   reactive fetal status.   Risks reviewed, questions answered.  Plan of care has been reviewed with patient and family.  All questions answered.      Pregnancy        H&P updated. The patient was examined and the following changes are noted above.         Jessica Jones MD  2019  12:12 PM

## 2019-05-08 NOTE — LACTATION NOTE
This note was copied from a baby's chart.  P1 term baby, nursed in RR. Mom in pain now and will call for assist with latching.

## 2019-05-08 NOTE — PLAN OF CARE
Problem: Patient Care Overview  Goal: Plan of Care Review  Outcome: Ongoing (interventions implemented as appropriate)   19 1410   Coping/Psychosocial   Plan of Care Reviewed With patient;spouse   Plan of Care Review   Progress improving   OTHER   Outcome Summary Delivery at 1306 via C/S     Goal: Individualization and Mutuality  Outcome: Ongoing (interventions implemented as appropriate)   19 1410   Individualization   Patient Specific Preferences C/S for Breech/ transverse lie, Spinal for AA, FOB in OR, BECCA in Recovery, breastfeeding   Patient Specific Goals (Include Timeframe) Healthy Mom and Baby by D/C   Patient Specific Interventions None   Mutuality/Individual Preferences   What Anxieties, Fears, Concerns, or Questions Do You Have About Your Care? Pain Control   What Information Would Help Us Give You More Personalized Care? None   How Would You and/or Your Support Person Like to Participate in Your Care? None   Mutuality/Individual Preferences   How to Address Anxieties/Fears None     Goal: Discharge Needs Assessment  Outcome: Ongoing (interventions implemented as appropriate)   19 1410   Discharge Needs Assessment   Readmission Within the Last 30 Days no previous admission in last 30 days   Concerns to be Addressed no discharge needs identified   Patient/Family Anticipates Transition to home   Patient/Family Anticipated Services at Transition none   Transportation Concerns car, none   Transportation Anticipated car, drives self;family or friend will provide   Anticipated Changes Related to Illness none   Equipment Needed After Discharge none   Offered/Gave Vendor List no   Disability   Equipment Currently Used at Home none     Goal: Interprofessional Rounds/Family Conf  Outcome: Ongoing (interventions implemented as appropriate)   19 141   Interdisciplinary Rounds/Family Conf   Participants family;patient       Problem:  Delivery (Adult,Obstetrics,Pediatric)  Goal: Signs and  Symptoms of Listed Potential Problems Will be Absent, Minimized or Managed ( Delivery)  Outcome: Outcome(s) achieved Date Met: 19   Goal/Outcome Evaluation   Problems Assessed ( Delivery) all   Problems Present ( Delivery) none       Problem: Anesthesia/Analgesia, Neuraxial (Obstetrics)  Goal: Signs and Symptoms of Listed Potential Problems Will be Absent, Minimized or Managed (Anesthesia/Analgesia, Neuraxial)  Outcome: Ongoing (interventions implemented as appropriate)   19   Goal/Outcome Evaluation   Problems Assessed (Neuraxial Anesthesia/Analgesia, OB) all   Problems Present (Neuraxial Anesth OB) none       Problem: Fall Risk,  (Adult,Obstetrics,Pediatric)  Goal: Identify Related Risk Factors and Signs and Symptoms  Outcome: Ongoing (interventions implemented as appropriate)   19   Fall Risk,  (Adult,Obstetrics,Pediatric)   Related Risk Factors (Fall Risk, ) fatigue;prolonged bed rest;regional anesthesia   Signs and Symptoms (Fall Risk, ) presence of fall risk factors     Goal: Absence of Maternal Fall  Outcome: Ongoing (interventions implemented as appropriate)   19   Fall Risk,  (Adult,Obstetrics,Pediatric)   Absence of Maternal Fall making progress toward outcome     Goal: Absence of  Fall/Drop  Outcome: Ongoing (interventions implemented as appropriate)   19   Fall Risk,  (Adult,Obstetrics,Pediatric)   Absence of [place] Fall/Drop making progress toward outcome       Problem: Skin Injury Risk (Adult)  Goal: Identify Related Risk Factors and Signs and Symptoms  Outcome: Ongoing (interventions implemented as appropriate)   19   Skin Injury Risk (Adult)   Related Risk Factors (Skin Injury Risk) mobility impaired;medication;moisture     Goal: Skin Health and Integrity  Outcome: Ongoing (interventions implemented as appropriate)   19   Skin  Injury Risk (Adult)   Skin Health and Integrity making progress toward outcome

## 2019-05-08 NOTE — ANESTHESIA PREPROCEDURE EVALUATION
Anesthesia Evaluation     Patient summary reviewed and Nursing notes reviewed   NPO Solid Status: > 8 hours  NPO Liquid Status: > 2 hours           Airway   Mallampati: II  TM distance: >3 FB  Neck ROM: full  Dental - normal exam     Pulmonary - normal exam    breath sounds clear to auscultation  (+) asthma,   Cardiovascular - normal exam    Rhythm: regular  Rate: normal    (+) valvular problems/murmurs murmur,   (-) angina, orthopnea, PND, PATEL      Neuro/Psych  (+) headaches,     GI/Hepatic/Renal/Endo - negative ROS     Musculoskeletal (-) negative ROS    Abdominal    Substance History - negative use     OB/GYN    (+) Pregnant,         Other - negative ROS                       Anesthesia Plan    ASA 2     spinal     Anesthetic plan, all risks, benefits, and alternatives have been provided, discussed and informed consent has been obtained with: patient.

## 2019-05-08 NOTE — ANESTHESIA PROCEDURE NOTES
Spinal Block    Pre-sedation assessment completed: 5/8/2019 12:50 PM    Patient reassessed immediately prior to procedure    Patient location during procedure: OB  Start Time: 5/8/2019 12:50 PM  Indication:at surgeon's request  Preanesthetic Checklist  Completed: patient identified, site marked, surgical consent, pre-op evaluation, timeout performed, IV checked, risks and benefits discussed and monitors and equipment checked  Spinal Block Prep:  Patient Position:sitting  Sterile Tech:cap, gloves, mask and sterile barriers  Prep:Chloraprep  Patient Monitoring:blood pressure monitoring, continuous pulse oximetry and EKG  Spinal Block Procedure  Guidance:landmark technique and palpation technique  Location:L4-L5  Needle Type:Sprotte  Needle Gauge:24 G  Placement of Spinal needle event:cerebrospinal fluid aspirated  Paresthesia: no  Fluid Appearance:clear  Medications: Morphine PF injection, 200 mcg  bupivacaine PF (MARCAINE) 0.75 % injection, 1.6 mL  fentaNYL citrate (PF) (SUBLIMAZE) injection, 25 mcg  Med Administered at 5/8/2019 12:46 PM   Post Assessment  Patient Tolerance:patient tolerated the procedure well with no apparent complications  Complications no

## 2019-05-08 NOTE — OP NOTE
[facility]   Section Operative Note    Patient Name: Farheen Chadwick  :  1990  MRN:  [id number]      Date of procedure:  2019        Pre-Operative Dx:   1.  IUP at 38w6d weeks   2. Breech presentation  3. Borderline oligohydramnios        Postoperative Dx:  Same        Procedure: , Low Transverse      Surgeon: Jessica Jones MD      Assistant: Dr. Christianson     Anesthesia: Spinal    EBL: 800cc         Specimens: None     Findings:                           Amniotic Fluid clear  Placenta Intact, 3 VC  Uterus normal  Tubes and ovaries normal bilaterally              Infant:            Gender: Viable female  infant     Weight: 3880 g (8 lb 8.9 oz)     Apgars: 8   @ 1 minute /     9   @ 5 minutes                 Indication for C/Section:     breech           Procedure Details:  The patient was taken to the operating room where spinal anesthesia was found to be adequate. She was prepped and draped in the usual sterile manner in the dorsal supine position with leftward tilt. A Pfannenstiel incision was made and carried down to the fascia. The fascia was incised in the mid-line and extended transversely with Garay scissors. The fascia was grasped and elevated and  from the underlying rectus muscles superiorly and inferiorly. The peritoneum was identified and entered. Peritoneal incision was extended superiorly and inferiorly with good visualization of the bladder. The bladder blade was inserted and the vesicouterine peritoneum was incised transversely and the bladder flap was created digitally. The bladder blade was reinserted. The  lower uterine segment was incised in a transverse fashion.  The butt was elevated and delivered through the incision. The remainder of the infant was delivered without difficulty using standard breech maneuvers. The umbilical cord was clamped and cut and the infant was handed off the field. Cord ph and cord bloods were obtained. The placenta was  removed intact and appeared normal. The uterine incision was closed with running locked sutures of 0 chromic. Second layer closure was placed imbricating the first for hemostasis. The abdominal cavity was irrigated and cleared of all clot and debris. The uterine incision was inspected and noted to be hemostatic. Rectus muscles were reapproximated in the midline with 0 vicryl.  The fascia was closed with 0 vicryl in running continuous fashion. The subcutaneous tissue was closed with 3-0 vicryl. The skin was closed in subcuticular fashion with 4-0 Vicryl.   Instrument, sponge, and needle counts were correct prior the abdominal closure and at the conclusion of the case. Mother and baby  to recovery room in stable condition.              Complications:     None          Antibiotics: cefazolin (Ancef)                      Jessica Jones MD  5/8/2019  1:42 PM

## 2019-05-08 NOTE — ANESTHESIA POSTPROCEDURE EVALUATION
"Patient: Farheen Chadwick    Procedure Summary     Date:  19 Room / Location:   [facility] /  [facility]    Anesthesia Start:  1236 Anesthesia Stop:      Procedure:   SECTION PRIMARY (N/A Abdomen) Diagnosis:      Surgeon:  Jessica Jones MD Provider:  Scott Muhammad MD    Anesthesia Type:  spinal ASA Status:  2          Anesthesia Type: spinal  Last vitals  BP   112/82 (19)   Temp   36.1 °C (97 °F) (19)   Pulse   89 (19)   Resp   18 (19)     SpO2   99 % (19)     Post Anesthesia Care and Evaluation    Patient location during evaluation: bedside  Patient participation: complete - patient participated  Level of consciousness: awake and alert  Pain management: adequate  Airway patency: patent  Anesthetic complications: No anesthetic complications    Cardiovascular status: acceptable  Respiratory status: acceptable  Hydration status: acceptable    Comments: /82 (BP Location: Right arm, Patient Position: Lying)   Pulse 89   Temp 36.1 °C (97 °F) (Oral)   Resp 18   Ht 162.6 cm (64\")   Wt 82.6 kg (182 lb)   SpO2 99%   Breastfeeding? Yes   BMI 31.24 kg/m²       "

## 2019-05-09 PROBLEM — Z34.90 PREGNANCY: Status: RESOLVED | Noted: 2019-03-25 | Resolved: 2019-05-09

## 2019-05-09 LAB
BASOPHILS # BLD AUTO: 0.04 10*3/MM3 (ref 0–0.2)
BASOPHILS NFR BLD AUTO: 0.3 % (ref 0–1.5)
DEPRECATED RDW RBC AUTO: 49.1 FL (ref 37–54)
EOSINOPHIL # BLD AUTO: 0.37 10*3/MM3 (ref 0–0.4)
EOSINOPHIL NFR BLD AUTO: 2.9 % (ref 0.3–6.2)
ERYTHROCYTE [DISTWIDTH] IN BLOOD BY AUTOMATED COUNT: 14 % (ref 12.3–15.4)
HCT VFR BLD AUTO: 28.6 % (ref 34–46.6)
HGB BLD-MCNC: 9.2 G/DL (ref 12–15.9)
IMM GRANULOCYTES # BLD AUTO: 0.15 10*3/MM3 (ref 0–0.05)
IMM GRANULOCYTES NFR BLD AUTO: 1.2 % (ref 0–0.5)
LYMPHOCYTES # BLD AUTO: 2.01 10*3/MM3 (ref 0.7–3.1)
LYMPHOCYTES NFR BLD AUTO: 15.9 % (ref 19.6–45.3)
MCH RBC QN AUTO: 30.9 PG (ref 26.6–33)
MCHC RBC AUTO-ENTMCNC: 32.2 G/DL (ref 31.5–35.7)
MCV RBC AUTO: 96 FL (ref 79–97)
MONOCYTES # BLD AUTO: 0.69 10*3/MM3 (ref 0.1–0.9)
MONOCYTES NFR BLD AUTO: 5.5 % (ref 5–12)
NEUTROPHILS # BLD AUTO: 9.36 10*3/MM3 (ref 1.7–7)
NEUTROPHILS NFR BLD AUTO: 74.2 % (ref 42.7–76)
NRBC BLD AUTO-RTO: 0 /100 WBC (ref 0–0.2)
PLATELET # BLD AUTO: 187 10*3/MM3 (ref 140–450)
PMV BLD AUTO: 10.5 FL (ref 6–12)
RBC # BLD AUTO: 2.98 10*6/MM3 (ref 3.77–5.28)
WBC NRBC COR # BLD: 12.62 10*3/MM3 (ref 3.4–10.8)

## 2019-05-09 PROCEDURE — 85025 COMPLETE CBC W/AUTO DIFF WBC: CPT | Performed by: OBSTETRICS & GYNECOLOGY

## 2019-05-09 RX ADMIN — BETAMETHASONE VALERATE: 1.2 OINTMENT TOPICAL at 06:39

## 2019-05-09 RX ADMIN — OXYCODONE HYDROCHLORIDE AND ACETAMINOPHEN 1 TABLET: 10; 325 TABLET ORAL at 16:07

## 2019-05-09 RX ADMIN — IBUPROFEN 600 MG: 600 TABLET ORAL at 09:54

## 2019-05-09 RX ADMIN — OXYCODONE HYDROCHLORIDE AND ACETAMINOPHEN 1 TABLET: 5; 325 TABLET ORAL at 09:54

## 2019-05-09 RX ADMIN — SIMETHICONE CHEW TAB 80 MG 80 MG: 80 TABLET ORAL at 09:55

## 2019-05-09 RX ADMIN — OXYCODONE HYDROCHLORIDE AND ACETAMINOPHEN 1 TABLET: 5; 325 TABLET ORAL at 05:04

## 2019-05-09 RX ADMIN — SIMETHICONE CHEW TAB 80 MG 80 MG: 80 TABLET ORAL at 22:10

## 2019-05-09 RX ADMIN — IBUPROFEN 600 MG: 600 TABLET ORAL at 00:09

## 2019-05-09 RX ADMIN — OXYCODONE HYDROCHLORIDE AND ACETAMINOPHEN 1 TABLET: 5; 325 TABLET ORAL at 00:09

## 2019-05-09 RX ADMIN — OXYCODONE HYDROCHLORIDE AND ACETAMINOPHEN 1 TABLET: 10; 325 TABLET ORAL at 22:10

## 2019-05-09 RX ADMIN — SIMETHICONE CHEW TAB 80 MG 80 MG: 80 TABLET ORAL at 16:07

## 2019-05-09 RX ADMIN — IBUPROFEN 600 MG: 600 TABLET ORAL at 18:01

## 2019-05-09 RX ADMIN — Medication 1 TABLET: at 09:55

## 2019-05-09 NOTE — PLAN OF CARE
Problem: Postpartum ( Delivery) (Adult,Obstetrics,Pediatric)  Goal: Signs and Symptoms of Listed Potential Problems Will be Absent, Minimized or Managed (Postpartum)  Outcome: Ongoing (interventions implemented as appropriate)   19 3202   Goal/Outcome Evaluation   Problems Assessed (Postpartum ) all   Problems Present (Postpartum ) none

## 2019-05-09 NOTE — PROGRESS NOTES
[facility]   PROGRESS NOTE    Patient Name: Farheen Chadwick  :  1990  MRN:  [id number]      Post-Op Day 1 S/P    Delivered a female infant.  Subjective     Patient reports:    Pain is well controlled. Voiding and ambulating without difficulty.    Tolerating po. Lochia normal.       The patient plans to breastfeed.         Objective       Vitals: Vital Signs Range for the last 24 hours  Temperature: Temp:  [97 °F (36.1 °C)-98.2 °F (36.8 °C)] 98.1 °F (36.7 °C)   Temp Source: Temp src: Oral   BP: BP: (0-141)/(0-92) 121/85   Pulse: Heart Rate:  [] 107   Respirations: Resp:  [16-18] 18         Intake/Output Summary (Last 24 hours) at 2019 1017  Last data filed at 2019 0800  Gross per 24 hour   Intake 3379 ml   Output 4500 ml   Net -1121 ml                                              Physical Exam     General Alert and awake, in NAD      CV Regular rate and rhythm      Lungs clear to auscultation bilaterally        Abdomen Soft, non-distended, fundus firm,  1 below umbilicus, appropriately tender      Incision  Dressing clean, dry and intact.      Extremities  tr edema, calves NT               LABS: Results from last 7 days   Lab Units 19  0440 19  1117   WBC 10*3/mm3 12.62* 12.79*   HEMOGLOBIN g/dL 9.2* 12.0   HEMATOCRIT % 28.6* 35.7   PLATELETS 10*3/mm3 187 267         Prenatal labs results reviewed:  Yes   Rubella:  Immune  Rh Status:    RH type   Date Value Ref Range Status   2019 Positive  Final                       Assessment/Plan   : 1. POD 1 S/P C/S: Hemodynamically stable.  Doing well.  Continue routine care.     Anemia-- not lightheaded or dizzy-- d/w fe on d/c      Postpartum anemia          Claudette Hernandez, APRN  2019  10:17 AM

## 2019-05-09 NOTE — LACTATION NOTE
P1. LC observed baby latched to right breast per patient. Latch felt comfortable and baby had a strong , rhythmic tug with swallows. FOB helpful and supportive. Will call LC as needed.

## 2019-05-09 NOTE — LACTATION NOTE
Mom wanting assistance with waking and latching baby. Educated mom on positioning and importance of deep latching and ways to achieve it. Mom reports that latch feels better. Encouraged to call if needing further assistance.  Lactation Consult Note    Evaluation Completed: 2019 12:39 PM  Patient Name: Farheen Chadwick  :  1990  MRN:  [id number]     REFERRAL  INFORMATION:                          Date of Referral: 19   Person Making Referral: patient  Maternal Reason for Referral: breastfeeding currently       DELIVERY HISTORY:          Skin to skin initiation date/time: 2019  1:45 PM   Skin to skin end date/time:              MATERNAL ASSESSMENT:     Breast Shape: round (19 1200 : Sarika Rodríguez RN)  Breast Density: soft (19 1200 : Sarika Rodríguez RN)  Areola: elastic (19 1200 : Sarika Rodríguez RN)  Nipples: everted (19 1200 : Sarika Rodríguez RN)                INFANT ASSESSMENT:  Information for the patient's :  Yudith Chadwick [[id number]]   No past medical history on file.    Feeding Readiness Cues: rooting (19 1200 : Sarika Rodríguez RN)   Feeding Method: breastfeeding (19 1200 : Sarika Rodríguez RN)   Feeding Tolerance/Success: adequate pause for breath, coordinated suck, coordinated swallow, eager, strong suck (19 1200 : Sarika Rodríguez RN)                           Additional Documentation: LATCH Score (Group) (19 1200 : Sarika Rodríguez RN)                           Effective Latch During Feeding: yes (19 1200 : Sarika Rodríguez RN)   Suck/Swallow Coordination: present (19 1200 : Sarika Rodríguez RN)   Signs of Milk Transfer: infant jaw motion present (19 1200 : Sarika Rodríguez RN)       Latch: 2-->grasps breast, tongue down, lips flanged, rhythmic sucking (19 1200 : Marcos, Sarika L, RN)   Audible Swallowin-->none (19  1200 : Sarika Rodríguez RN)   Type of Nipple: 2-->everted (after stimulation) (05/09/19 1200 : Sarika Rodríguez RN)   Comfort (Breast/Nipple): 2-->soft/nontender (05/09/19 1200 : Sarika Rodríguez RN)   Hold (Positioning): 1-->minimal assist, teach one side, mother does other, staff holds (05/09/19 1200 : Sarika Rodríguez RN)   Latch Score: 7 (05/09/19 1200 : Sarika Rodríguez RN)                       MATERNAL INFANT FEEDING:     Maternal Emotional State: relaxed (05/09/19 1200 : Sarika Rodríguez RN)  Infant Positioning: cross-cradle (05/09/19 1200 : Sarika Rodríguez RN)   Signs of Milk Transfer: infant jaw motion present (05/09/19 1200 : Sarika Rodríguez RN)  Pain with Feeding: other (see comments)(nipples tender) (05/09/19 1200 : Sarika Rodríguez RN)              Comfort Measures Following Feeding: air-drying encouraged (05/09/19 1200 : Sarika Rodríguez RN)        Latch Assistance: yes (05/09/19 1200 : Sarika Rodríguez RN)                               EQUIPMENT TYPE:                                 BREAST PUMPING:          LACTATION REFERRALS:

## 2019-05-09 NOTE — LACTATION NOTE
Mom reports her nipples hurt so she wants to give formula this feeding. Encouraged hand expression or pumping if tolerable to stimulate breasts. Mom knows to air dry nipples and use APNO. [facility] Call LC if needing further assistance.    Lactation Consult Note    Evaluation Completed: 2019 5:07 PM  Patient Name: Farheen Chadwick  :  1990  MRN:  [id number]     REFERRAL  INFORMATION:                          Date of Referral: 19   Person Making Referral: patient  Maternal Reason for Referral: breastfeeding currently       DELIVERY HISTORY:          Skin to skin initiation date/time: 2019  1:45 PM   Skin to skin end date/time:              MATERNAL ASSESSMENT:     Breast Shape: round (19 1200 : Sarika Rodríguez RN)  Breast Density: soft (19 1200 : Sarika Rodríguez RN)  Areola: elastic (19 1200 : Sarika Rodríguez RN)  Nipples: everted (19 1200 : Sarika Rodríguez RN)                INFANT ASSESSMENT:  Information for the patient's :  Yudith Chadwick [[id number]]   No past medical history on file.    Feeding Readiness Cues: rooting (19 1200 : Sarika Rodríguez RN)   Feeding Method: breastfeeding (19 1200 : Sarika Rodríguez RN)   Feeding Tolerance/Success: adequate pause for breath, coordinated suck, coordinated swallow, eager, strong suck (19 1200 : Sarika Rodríguez RN)                           Additional Documentation: LATCH Score (Group) (19 1200 : Sarika Rodríguez RN)                           Effective Latch During Feeding: yes (19 1200 : Sarika Rodríguez RN)   Suck/Swallow Coordination: present (19 1200 : Sarika Rodríguez RN)   Signs of Milk Transfer: infant jaw motion present (19 1200 : Sarika Rodríguez RN)       Latch: 2-->grasps breast, tongue down, lips flanged, rhythmic sucking (19 1200 : Sarika Rodríguez RN)    Audible Swallowin-->none (19 1200 : Sarika Rodríguez RN)   Type of Nipple: 2-->everted (after stimulation) (19 1200 : Sarika Rodríguez RN)   Comfort (Breast/Nipple): 2-->soft/nontender (19 1200 : Sarika Rodríguez RN)   Hold (Positioning): 1-->minimal assist, teach one side, mother does other, staff holds (19 1200 : Sarika Rodríguez RN)   Latch Score: 7 (19 1200 : Sarika Rodríguez RN)                       MATERNAL INFANT FEEDING:     Maternal Emotional State: relaxed (19 1200 : Sarika Rodríguez RN)  Infant Positioning: cross-cradle (19 1200 : Sarika Rodríguez RN)   Signs of Milk Transfer: infant jaw motion present (19 1200 : Sarika Rodríguez RN)  Pain with Feeding: other (see comments)(nipples tender) (19 1200 : Sarika Rodríguez RN)              Comfort Measures Following Feeding: air-drying encouraged (19 1200 : Sarika Rodríguez RN)        Latch Assistance: yes (19 1200 : Sarika Rodríguez RN)                               EQUIPMENT TYPE:                                 BREAST PUMPING:          LACTATION REFERRALS:

## 2019-05-10 RX ORDER — DOCUSATE SODIUM 100 MG/1
100 CAPSULE, LIQUID FILLED ORAL 2 TIMES DAILY PRN
Status: DISCONTINUED | OUTPATIENT
Start: 2019-05-10 | End: 2019-05-11 | Stop reason: HOSPADM

## 2019-05-10 RX ADMIN — DOCUSATE SODIUM 100 MG: 100 CAPSULE, LIQUID FILLED ORAL at 21:44

## 2019-05-10 RX ADMIN — IBUPROFEN 600 MG: 600 TABLET ORAL at 19:29

## 2019-05-10 RX ADMIN — SIMETHICONE CHEW TAB 80 MG 80 MG: 80 TABLET ORAL at 21:44

## 2019-05-10 RX ADMIN — SIMETHICONE CHEW TAB 80 MG 80 MG: 80 TABLET ORAL at 08:37

## 2019-05-10 RX ADMIN — IBUPROFEN 600 MG: 600 TABLET ORAL at 02:23

## 2019-05-10 RX ADMIN — OXYCODONE HYDROCHLORIDE AND ACETAMINOPHEN 1 TABLET: 10; 325 TABLET ORAL at 11:27

## 2019-05-10 RX ADMIN — SIMETHICONE CHEW TAB 80 MG 80 MG: 80 TABLET ORAL at 02:23

## 2019-05-10 RX ADMIN — OXYCODONE HYDROCHLORIDE AND ACETAMINOPHEN 1 TABLET: 10; 325 TABLET ORAL at 02:24

## 2019-05-10 RX ADMIN — OXYCODONE HYDROCHLORIDE AND ACETAMINOPHEN 1 TABLET: 10; 325 TABLET ORAL at 06:31

## 2019-05-10 RX ADMIN — IBUPROFEN 600 MG: 600 TABLET ORAL at 11:27

## 2019-05-10 RX ADMIN — Medication 1 TABLET: at 08:37

## 2019-05-10 RX ADMIN — SIMETHICONE CHEW TAB 80 MG 80 MG: 80 TABLET ORAL at 16:06

## 2019-05-10 RX ADMIN — OXYCODONE HYDROCHLORIDE AND ACETAMINOPHEN 1 TABLET: 10; 325 TABLET ORAL at 16:06

## 2019-05-10 RX ADMIN — Medication: at 08:37

## 2019-05-10 RX ADMIN — OXYCODONE HYDROCHLORIDE AND ACETAMINOPHEN 1 TABLET: 5; 325 TABLET ORAL at 20:10

## 2019-05-10 NOTE — LACTATION NOTE
Lactation Consult Note  Mom hurting and having difficulty latching. Assisted with deep latch although nipples were pinched when coming off but reports feeling better. She had been latching in cross cradle so football felt better. Baby is sleepy at breast so encouraged to pumping although she nursed better on second side. Gave card for [facility]. They are supplementing with formula, discussed paced feeding and offer breast first if Mom feeling okay. Encouraged pumping every 3 hrs if baby not latching well.  Evaluation Completed: 5/10/2019 10:30 AM  Patient Name: Farheen Chadwick  :  1990  MRN:  [id number]     REFERRAL  INFORMATION:                          Date of Referral: 05/10/19   Person Making Referral: patient  Maternal Reason for Referral: breastfeeding currently       DELIVERY HISTORY:          Skin to skin initiation date/time: 2019  1:45 PM   Skin to skin end date/time:              MATERNAL ASSESSMENT:  Breast Size Issue: none (05/10/19 0930 : Sarika Olivia RN)  Breast Shape: Bilateral:, round (05/10/19 0930 : Sarika Olivia RN)  Breast Density: Bilateral:, soft (05/10/19 0930 : Sarika Olivia RN)  Areola: Bilateral:, elastic (05/10/19 0930 : Sarika Olivia RN)  Nipples: Bilateral:, everted (05/10/19 0930 : Sarika Olivia RN)                INFANT ASSESSMENT:  Information for the patient's :  Elodia ChadwickNika [[id number]]   No past medical history on file.    Feeding Readiness Cues: energy for feeding (05/10/19 0930 : Sarika Olivia RN)   Feeding Method: breastfeeding (05/10/19 0930 : Sarika Olivia RN)   Feeding Tolerance/Success: arousal required, coordinated suck, coordinated swallow, sleepy (05/10/19 0930 : Sarika Olivia RN)                   Feeding Interventions: latch assistance provided (05/10/19 0930 : Sarika Olivia RN)                   Breastfeeding: breastfeeding, bilateral (05/10/19 0930 : Corwin  Sarika MCELROY RN)   Infant Positioning: clutch/football (05/10/19 0930 : Sarika Olivia RN)   Breastfeeding Time, Left (min): 10 (05/10/19 0930 : Sarika Olivia RN)   Breastfeeding Time, Right (min): 10 (05/10/19 0930 : Sarika Olivia RN)   Effective Latch During Feeding: yes (05/10/19 0930 : Sarika Olivia RN)   Suck/Swallow Coordination: present (05/10/19 0930 : Sarika Olivia RN)   Signs of Milk Transfer: infant jaw motion present, suck/swallow ratio (05/10/19 0930 : Sarika Olivia RN)       Latch: 2-->grasps breast, tongue down, lips flanged, rhythmic sucking (05/10/19 0930 : Sarika Olivia RN)   Audible Swallowin-->a few with stimulation (05/10/19 0930 : Sarika Olivia RN)   Type of Nipple: 2-->everted (after stimulation) (05/10/19 0930 : Sarika Olivia RN)   Comfort (Breast/Nipple): 2-->soft/nontender (05/10/19 0930 : Sarika Olivia RN)   Hold (Positioning): 1-->minimal assist, teach one side, mother does other, staff holds (05/10/19 0930 : Sarika Olivia RN)   Latch Score: 8 (05/10/19 0930 : Sarika Olivia RN)     Infant-Driven Feeding Scales - Readiness: Alert once handled. Some rooting or takes pacifier. Adequate tone. (05/10/19 0930 : Sarika Olivia RN)   Infant-Driven Feeding Scales - Quality: Nipples with a strong coordinated SSB but fatigues with progression. (05/10/19 0930 : Sarika Olivia RN)             MATERNAL INFANT FEEDING:  Maternal Preparation: breast care (05/10/19 0930 : Corwin, Sarika L, RN)  Maternal Emotional State: assist needed (05/10/19 0930 : Sarika Olivia RN)  Infant Positioning: clutch/football (05/10/19 0930 : Sarika Olivia RN)   Signs of Milk Transfer: infant jaw motion present, suck/swallow ratio (05/10/19 0930 : Sarika Olivia RN)  Pain with Feeding: other (see comments)(slight) (05/10/19 0930 : Sarika Olivia RN)     Pain Description: soreness  (05/10/19 0930 : Sarika Olivia, RN)  Comfort Measures Before/During Feeding: latch adjusted (05/10/19 0930 : Sarika Olivia, RN)  Milk Ejection Reflex: present (05/10/19 0930 : Sarika Olivia RN)           Latch Assistance: yes (05/10/19 0930 : Sarika Olivia, ALESHIA)                               EQUIPMENT TYPE:  Breast Pump Type: double electric, hospital grade (05/10/19 0930 : Sarika Olivia, RN)             Breastfeeding Assistance: assisted with positioning, feeding cue recognition promoted, feeding session observed, infant suck/swallow verified, infant latch-on verified (05/10/19 0930 : Sarika Olivia, RN)     Breastfeeding Support: lactation counseling provided (05/10/19 0930 : Sarika Olivia, RN)          BREAST PUMPING:          LACTATION REFERRALS:  Lactation Referrals: outpatient lactation program (05/10/19 0930 : Sarika Olivia, ALESHIA)

## 2019-05-10 NOTE — PROGRESS NOTES
[facility]   PROGRESS NOTE    Patient Name: Farheen Chadwick  :  1990  MRN:  [id number]      Post-Op 2 S/P   Subjective     Patient reports:   Doing well. Pain well controlled. Tolerating regular diet and having flatus.    Lochia normal.       Objective       Vitals: Vital Signs Range for the last 24 hours  Temperature: Temp:  [97.6 °F (36.4 °C)-98.4 °F (36.9 °C)] 98 °F (36.7 °C)       BP: BP: (111-134)/(72-87) 114/74   Pulse: Heart Rate:  [] 80   Respirations: Resp:  [16-18] 16                                         Physical Exam     General Alert       Abdomen Soft, non-distended, fundus firm, 1 below umbilicus, appropriately tender      Incision  Intact, no erythema or exudate      Extremities Calves NT bilaterally                Assessment/Plan  :   POD 2  -  Doing well.  Continue usual cares.           Postpartum anemia               Claudette Hernandez, [place]  5/10/2019  10:53 AM  Patient seen and examined. Agree with above assessment.     Leana Arias MD  5/10/2019  2:15 PM

## 2019-05-11 VITALS
WEIGHT: 182 LBS | HEART RATE: 94 BPM | SYSTOLIC BLOOD PRESSURE: 109 MMHG | OXYGEN SATURATION: 99 % | TEMPERATURE: 98 F | BODY MASS INDEX: 31.07 KG/M2 | DIASTOLIC BLOOD PRESSURE: 73 MMHG | HEIGHT: 64 IN | RESPIRATION RATE: 16 BRPM

## 2019-05-11 RX ORDER — IBUPROFEN 600 MG/1
600 TABLET ORAL EVERY 8 HOURS PRN
Qty: 30 TABLET | Refills: 30 | Status: SHIPPED | OUTPATIENT
Start: 2019-05-11 | End: 2019-12-16

## 2019-05-11 RX ORDER — OXYCODONE HYDROCHLORIDE AND ACETAMINOPHEN 5; 325 MG/1; MG/1
2 TABLET ORAL EVERY 4 HOURS PRN
Qty: 30 TABLET | Refills: 0 | Status: SHIPPED | OUTPATIENT
Start: 2019-05-11 | End: 2019-05-14

## 2019-05-11 RX ADMIN — Medication 1 TABLET: at 08:43

## 2019-05-11 RX ADMIN — OXYCODONE HYDROCHLORIDE AND ACETAMINOPHEN 1 TABLET: 5; 325 TABLET ORAL at 12:40

## 2019-05-11 RX ADMIN — IBUPROFEN 600 MG: 600 TABLET ORAL at 04:15

## 2019-05-11 RX ADMIN — OXYCODONE HYDROCHLORIDE AND ACETAMINOPHEN 1 TABLET: 5; 325 TABLET ORAL at 08:43

## 2019-05-11 RX ADMIN — OXYCODONE HYDROCHLORIDE AND ACETAMINOPHEN 1 TABLET: 5; 325 TABLET ORAL at 00:09

## 2019-05-11 RX ADMIN — OXYCODONE HYDROCHLORIDE AND ACETAMINOPHEN 1 TABLET: 5; 325 TABLET ORAL at 04:15

## 2019-05-11 RX ADMIN — SIMETHICONE CHEW TAB 80 MG 80 MG: 80 TABLET ORAL at 08:43

## 2019-05-11 RX ADMIN — DOCUSATE SODIUM 100 MG: 100 CAPSULE, LIQUID FILLED ORAL at 08:43

## 2019-05-11 RX ADMIN — IBUPROFEN 600 MG: 600 TABLET ORAL at 12:40

## 2019-05-11 NOTE — PLAN OF CARE
Problem: Patient Care Overview  Goal: Plan of Care Review  Outcome: Ongoing (interventions implemented as appropriate)   19   Coping/Psychosocial   Plan of Care Reviewed With patient;spouse   Plan of Care Review   Progress improving   OTHER   Outcome Summary assessment wnl; VSS; incision well approximated; pain meds prn; breastfeeding infant with formula supplementation; plan for possible discharge in am     Goal: Individualization and Mutuality  Outcome: Ongoing (interventions implemented as appropriate)    Goal: Discharge Needs Assessment  Outcome: Ongoing (interventions implemented as appropriate)   19   Discharge Needs Assessment   Readmission Within the Last 30 Days no previous admission in last 30 days   Concerns to be Addressed no discharge needs identified   Patient/Family Anticipates Transition to home with family   Patient/Family Anticipated Services at Transition none   Transportation Concerns car, none   Transportation Anticipated car, drives self;family or friend will provide   Anticipated Changes Related to Illness none   Equipment Needed After Discharge none   Disability   Equipment Currently Used at Home none       Problem: Breastfeeding (Adult,Obstetrics,Pediatric)  Goal: Signs and Symptoms of Listed Potential Problems Will be Absent, Minimized or Managed (Breastfeeding)  Outcome: Ongoing (interventions implemented as appropriate)   19   Goal/Outcome Evaluation   Problems Assessed (Breastfeeding) all   Problems Present (Breastfeeding) none       Problem: Postpartum ( Delivery) (Adult,Obstetrics,Pediatric)  Goal: Signs and Symptoms of Listed Potential Problems Will be Absent, Minimized or Managed (Postpartum)  Outcome: Ongoing (interventions implemented as appropriate)   19   Goal/Outcome Evaluation   Problems Assessed (Postpartum ) all   Problems Present (Postpartum ) none

## 2019-05-11 NOTE — DISCHARGE SUMMARY
Date of Discharge:  2019    Discharge Diagnosis:     Presenting Problem/History of Present Illness  Pregnancy [Z34.90]       Hospital Course  Patient is a 29 y.o. female presented for primary  d/t breech presentation and borderline oligohydramnios. Delivered viable female infant. Hospital course has been uncomplicated. Pt is stable and ready for d/c.       Procedures Performed  Procedure(s):   SECTION PRIMARY         Condition on Discharge:  Post-Op Day 3 S/P   Subjective     Patient reports:    Doing well - ready for discharge. Pain well controlled. Tolerating po and   having flatus. Voiding and ambulating without difficulty. Lochia normal.     Vital Signs  Temp:  [97.1 °F (36.2 °C)-98 °F (36.7 °C)] 98 °F (36.7 °C)  Heart Rate:  [84-98] 94  Resp:  [16] 16  BP: (109-120)/(73-78) 109/73    Physical Exam    Gen Alert and awake   Abdomen Soft, ND,  Fundus firm with minimal tenderness   Incision  Intact, without erythema or exudate   Extremities Calves NT bilaterally     Assessment/Plan ]   Assessment:  POD 3  -  Doing well. Stable for discharge.    Anemia- d/w home with oral iron supplement    Plan:    Instructions reviewed       Consults:   Consults     No orders found from 2019 to 2019.          Discharge Disposition  Home or Self Care    Discharge Medications     Discharge Medications      New Medications      Instructions Start Date   ibuprofen 600 MG tablet  Commonly known as:  ADVIL,MOTRIN   600 mg, Oral, Every 8 Hours PRN      oxyCODONE-acetaminophen 5-325 MG per tablet  Commonly known as:  PERCOCET   2 tablets, Oral, Every 4 Hours PRN         Continue These Medications      Instructions Start Date   ferrous sulfate 325 (65 FE) MG tablet   325 mg, Oral, Every Other Day      prenatal (CLASSIC) vitamin 28-0.8 MG tablet tablet   1 tablet, Oral, Daily         Stop These Medications    calcium carbonate 600 MG tablet  Commonly known as:  OS-ZENIA     CLARITIN 10 MG  tablet  Generic drug:  loratadine     docusate sodium 100 MG capsule  Commonly known as:  COLACE     raNITIdine 75 MG tablet  Commonly known as:  ZANTAC            The patient has been prescribed a controlled substance.  She has been counseled on the risks associated with using the medication.   The addictive potential of this medication and alternatives were discussed carefully with this patient and she demonstrated understanding.  A RUTHIE report has been obtained and reviewed.    Activity at Discharge:     Follow-up Appointments  No future appointments.      Test Results Pending at Discharge       [place] Champion  05/11/19  10:19 AM

## 2019-05-16 ENCOUNTER — TELEPHONE (OUTPATIENT)
Dept: LACTATION | Facility: HOSPITAL | Age: 29
End: 2019-05-16

## 2019-05-16 NOTE — TELEPHONE ENCOUNTER
Mom reports she pumps 2 times a day if baby doesn't BF. She gets from 0.5 to 1 oz. Mom gives supplement formula. Educated on supply and demand and encouraged mom to increase pumping and f/u in [facility]

## 2019-10-28 ENCOUNTER — OFFICE VISIT (OUTPATIENT)
Dept: ORTHOPEDIC SURGERY | Facility: CLINIC | Age: 29
End: 2019-10-28

## 2019-10-28 ENCOUNTER — TELEPHONE (OUTPATIENT)
Dept: ORTHOPEDIC SURGERY | Facility: CLINIC | Age: 29
End: 2019-10-28

## 2019-10-28 VITALS — WEIGHT: 159 LBS | BODY MASS INDEX: 27.14 KG/M2 | HEIGHT: 64 IN | TEMPERATURE: 97.8 F

## 2019-10-28 DIAGNOSIS — M25.511 RIGHT SHOULDER PAIN, UNSPECIFIED CHRONICITY: Primary | ICD-10-CM

## 2019-10-28 PROCEDURE — 99214 OFFICE O/P EST MOD 30 MIN: CPT | Performed by: ORTHOPAEDIC SURGERY

## 2019-10-28 PROCEDURE — 73030 X-RAY EXAM OF SHOULDER: CPT | Performed by: ORTHOPAEDIC SURGERY

## 2019-10-28 RX ORDER — METHYLPREDNISOLONE 4 MG/1
TABLET ORAL
Qty: 21 TABLET | Refills: 0 | Status: SHIPPED | OUTPATIENT
Start: 2019-10-28 | End: 2019-12-16

## 2019-10-28 RX ORDER — METHYLPREDNISOLONE 4 MG/1
TABLET ORAL
Qty: 21 TABLET | Refills: 0 | Status: SHIPPED | OUTPATIENT
Start: 2019-10-28 | End: 2019-10-28

## 2019-10-28 NOTE — PROGRESS NOTES
"  Patient: Farheen Chadwick    YOB: 1990    Medical Record Number: [id number]    Chief Complaints: Right shoulder pain    History of Present Illness:     29 y.o. female patient who presents for her right shoulder.  Her symptoms started in the middle of August.  She says that she \"thought it might have dislocated\".  She is not sure.  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  She tells me that they periodically pop out of place.  She says that the joints always go back in place on their own.  She has never undergone a formal closed reduction.  She has been told in the past that there is the possibility she could have Kenrick-Danlos syndrome.  This has never been investigated.  She has seen a rheumatologist for chronic joint pains.  She says that an extensive work-up was performed and all of the lab work was reportedly normal.  She reports severe constant aching and burning pain in her right shoulder.  She reports that she is unable to raise or lift the arm.  She is tearful in describing how this has affected her quality of life.    Allergies:   Allergies   Allergen Reactions   • Latex Anaphylaxis   • Codeine Hallucinations   • Levaquin [Levofloxacin] Swelling   • Sulfa Antibiotics Unknown (See Comments)     Happened when she was a baby       Home Medications:    Current Outpatient Medications:   •  Docusate Sodium (DULCOLAX STOOL SOFTENER PO), Take  by mouth., Disp: , Rfl:   •  ferrous sulfate 325 (65 FE) MG tablet, Take 325 mg by mouth Every Other Day., Disp: , Rfl:   •  ibuprofen (ADVIL,MOTRIN) 600 MG tablet, Take 1 tablet by mouth Every 8 (Eight) Hours As Needed for Mild Pain ., Disp: 30 tablet, Rfl: 30  •  Prenatal Vit-Fe Fumarate-FA (PRENATAL, CLASSIC, VITAMIN) 28-0.8 MG tablet tablet, Take 1 tablet by mouth Daily., Disp: , Rfl:   •  methylPREDNISolone (MEDROL, KAREN,) 4 MG tablet, Use as directed by package instructions, Disp: 21 tablet, Rfl: 0    Past Medical History: "   Diagnosis Date   • Anemia    • Asthma     no inhaler   • Heart murmur     benign regurgitation   • Irritable bowel syndrome    • Migraine    • Seasonal allergies        Past Surgical History:   Procedure Laterality Date   •  SECTION N/A 2019    Procedure:  SECTION PRIMARY;  Surgeon: Jessica Jones MD;  Location: [facility] LABOR DELIVERY;  Service: Obstetrics/Gynecology   • EAR TUBES     • KNEE ARTHROSCOPY         Social History     Occupational History   • Not on file   Tobacco Use   • Smoking status: Never Smoker   • Smokeless tobacco: Never Used   Substance and Sexual Activity   • Alcohol use: No     Frequency: Never   • Drug use: No   • Sexual activity: Yes     Partners: Male      Social History     Social History Narrative   • Not on file       Family History   Problem Relation Age of Onset   • Hypertension Mother    • Diabetes Mother    • Lung disease Mother    • Heart disease Mother    • Cancer Paternal Uncle    • Clotting disorder Maternal Grandmother    • Cancer Maternal Grandfather    • Hypertension Maternal Grandfather    • Diabetes Paternal Grandmother    • Lung disease Paternal Grandmother    • Heart disease Paternal Grandmother    • Cancer Paternal Grandfather    • Hypertension Paternal Grandfather        Review of Systems:      Constitutional: Denies fever, shaking or chills   Eyes: Denies change in visual acuity   HEENT: Denies nasal congestion or sore throat   Respiratory: Denies cough or shortness of breath   Cardiovascular: Denies chest pain or edema  Endocrine: Denies tremors, palpitations, intolerance of heat or cold, polyuria, polydipsia.  GI: Denies abdominal pain, nausea, vomiting, bloody stools or diarrhea  : Denies frequency, urgency, incontinence, retention, or nocturia.  Musculoskeletal: Denies numbness, tingling or loss of motor function except as above  Integument: Denies rash, lesion or ulceration   Neurologic: Denies headache or focal weakness, deficits  Heme:  "Denies spontaneous or excessive bleeding, epistaxis, hematuria, melena, fatigue, enlarged or tender lymph nodes.      All other pertinent positives and negatives as noted above in HPI.    Physical Exam: 29 y.o. female  Vitals:    10/28/19 0832   Temp: 97.8 °F (36.6 °C)   TempSrc: Temporal   Weight: 72.1 kg (159 lb)   Height: 162.6 cm (64\")       General:  Patient is awake and alert.  Appears in no acute distress or discomfort.    Psych:  Affect and demeanor are appropriate.    Eyes:  Conjunctiva and sclera appear grossly normal.  Eyes track well and EOM seem to be intact.    Ears:  No gross abnormalities.  Hearing adequate for the exam.    Cardiovascular:  Regular rate and rhythm.    Lungs:  Good chest expansion.  Breathing unlabored.    Extremities: Right shoulder is examined.  Skin is benign.  No atrophy, swelling or masses.  She has a lot of anterior tenderness.  There is no erythema, increased warmth or palpable effusion.  No masses or adenopathy.  Her active and passive motion are very limited.  Again, she is tearful with range of motion.  She seems to have good external rotation with the elbow at the side and she does have full horizontal abduction as well.  I could not really get a good assessment of her strength because of her pain.  Essentially all provocative testing causes rather global discomfort, particularly anteriorly.  I could not assess her for instability.  She has normal motor and sensory function in her elbow, wrist and hand.  Good strength with elbow flexion extension, wrist flexion and extension and .  She has normal sensation throughout the arm and hand.  Palpable radial pulse.  Brisk capillary refill.    Imaging:  AP, scapular Y, and axillary views of the right shoulder are ordered by myself and reviewed to evaluate the patient's complaint.  No comparison films are immediately available.  The x-rays show no obvious acute abnormalities, lesions, masses, significant degenerative changes, or " other concerning findings.  The acromiohumeral interval is normal.  Glenoid version appears normal as well.    Assessment/Plan: Severe right shoulder pain of unclear etiology    I had a long conversation with her and her significant other who was at the bedside.  I cannot tell what going on with her.  Her exam is really limited because of the pain.  Given the absence of any trauma, I think it is unlikely that she has a tear and I was duglas with him about my assessment in this regard.  Although I cannot confirm this with her exam, she probably has a component of multidirectional instability based on her history.  I told her that I think the most prudent course of action at this point would be a trial of some oral anti-inflammatories and physical therapy.  I think therapy is probably going to be instrumental in getting her better.  This was my initial recommendation for her.  She says that therapy has never worked for her in the past.  She has had chronic problems with her hips and she did therapy and felt like it was a total waste of time.  She also feels like the pills are not going to be strong enough to alleviate her severe pain.  We then discussed the possible injection.  She actually opted for the injection.   I left the room while the preparations were being made.  I came back in the room to perform the injection and she was in tears.  She says that she is convinced something is wrong and that she strongly feels that she needs an MRI.  Ultimately I agreed to send her for the MRI at her request.  I told her I will call her with the results.  I gave her a trial of a Medrol Dosepak to see if we can get things to settle down.  Risks of the medicines were discussed.  If she gets better on the Dosepak and I encouraged her to cancel the MRI.    Zan Rios MD    10/28/2019

## 2019-10-28 NOTE — PROGRESS NOTES
***  Large Joint Arthrocentesis: R subacromial bursa  Date/Time: 10/28/2019 9:00 AM  Consent given by: patient  Site marked: site marked  Timeout: Immediately prior to procedure a time out was called to verify the correct patient, procedure, equipment, support staff and site/side marked as required   Supporting Documentation  Indications: pain   Procedure Details  Location: shoulder - R subacromial bursa  Preparation: Patient was prepped and draped in the usual sterile fashion  Needle gauge: 21 G.  Approach: posterior  Medications administered: 80 mg methylPREDNISolone acetate 80 MG/ML; 2 mL lidocaine (cardiac)  Patient tolerance: patient tolerated the procedure well with no immediate complications

## 2019-10-28 NOTE — TELEPHONE ENCOUNTER
CONT She said it also caused her achilles to be discolored and has numbness and tingling. She said the steroid is in the same law suit as the levaquin. She wants to insure its safe for her to take. Please advise.cld

## 2019-11-07 ENCOUNTER — HOSPITAL ENCOUNTER (OUTPATIENT)
Dept: GENERAL RADIOLOGY | Facility: HOSPITAL | Age: 29
Discharge: HOME | End: 2019-11-07

## 2019-11-07 ENCOUNTER — HOSPITAL ENCOUNTER (OUTPATIENT)
Dept: MRI IMAGING | Facility: HOSPITAL | Age: 29
Discharge: HOME | End: 2019-11-07
Admitting: RADIOLOGY

## 2019-11-07 DIAGNOSIS — M25.511 RIGHT SHOULDER PAIN, UNSPECIFIED CHRONICITY: ICD-10-CM

## 2019-11-07 PROCEDURE — 25010000003 LIDOCAINE 1 % SOLUTION: Performed by: RADIOLOGY

## 2019-11-07 PROCEDURE — 77002 NEEDLE LOCALIZATION BY XRAY: CPT

## 2019-11-07 PROCEDURE — A9577 INJ MULTIHANCE: HCPCS | Performed by: RADIOLOGY

## 2019-11-07 PROCEDURE — 25010000002 IOPAMIDOL 61 % SOLUTION: Performed by: RADIOLOGY

## 2019-11-07 PROCEDURE — 73222 MRI JOINT UPR EXTREM W/DYE: CPT

## 2019-11-07 PROCEDURE — 0 GADOBENATE DIMEGLUMINE 529 MG/ML SOLUTION: Performed by: RADIOLOGY

## 2019-11-07 RX ORDER — LIDOCAINE HYDROCHLORIDE 10 MG/ML
10 INJECTION, SOLUTION INFILTRATION; PERINEURAL ONCE
Status: COMPLETED | OUTPATIENT
Start: 2019-11-07 | End: 2019-11-07

## 2019-11-07 RX ADMIN — GADOBENATE DIMEGLUMINE 0.05 ML: 529 INJECTION, SOLUTION INTRAVENOUS at 08:55

## 2019-11-07 RX ADMIN — LIDOCAINE HYDROCHLORIDE 2 ML: 10 INJECTION, SOLUTION INFILTRATION; PERINEURAL at 08:55

## 2019-11-07 RX ADMIN — IOPAMIDOL 5 ML: 612 INJECTION, SOLUTION INTRAVENOUS at 08:55

## 2019-11-08 ENCOUNTER — TELEPHONE (OUTPATIENT)
Dept: ORTHOPEDIC SURGERY | Facility: CLINIC | Age: 29
End: 2019-11-08

## 2019-11-11 ENCOUNTER — TELEPHONE (OUTPATIENT)
Dept: ORTHOPEDIC SURGERY | Facility: CLINIC | Age: 29
End: 2019-11-11

## 2019-11-11 NOTE — TELEPHONE ENCOUNTER
I spoke with her.  We discussed her MRI findings.  I am going to have the office contact her to make her another appointment with me so that we can discuss further.

## 2019-11-13 ENCOUNTER — OFFICE VISIT (OUTPATIENT)
Dept: ORTHOPEDIC SURGERY | Facility: CLINIC | Age: 29
End: 2019-11-13

## 2019-11-13 VITALS — HEIGHT: 64 IN | BODY MASS INDEX: 25.95 KG/M2 | WEIGHT: 152 LBS | TEMPERATURE: 99.2 F

## 2019-11-13 DIAGNOSIS — M25.311 SHOULDER INSTABILITY, RIGHT: Primary | ICD-10-CM

## 2019-11-13 PROCEDURE — 99213 OFFICE O/P EST LOW 20 MIN: CPT | Performed by: ORTHOPAEDIC SURGERY

## 2019-11-17 RX ORDER — CEFAZOLIN SODIUM 2 G/100ML
2 INJECTION, SOLUTION INTRAVENOUS ONCE
Status: CANCELLED | OUTPATIENT
Start: 2019-12-31 | End: 2019-11-17

## 2019-11-17 NOTE — PROGRESS NOTES
Patient: Farheen Chadwick    YOB: 1990    Medical Record Number: [id number]    Chief Complaints: Follow-up regarding right shoulder pain    History of Present Illness:     29 y.o. female patient who presents for low up of her right shoulder.  She is still having frequent constant pain in the shoulder.  She is fearful the shoulder will pop out again.  She did get her MRI presents today to review that.    Allergies:   Allergies   Allergen Reactions   • Latex Anaphylaxis   • Sulfa Antibiotics Unknown (See Comments)     Happened when she was a baby   • Codeine Hallucinations   • Levaquin [Levofloxacin] Swelling       Home Medications:    Current Outpatient Medications:   •  Docusate Sodium (DULCOLAX STOOL SOFTENER PO), Take  by mouth., Disp: , Rfl:   •  ferrous sulfate 325 (65 FE) MG tablet, Take 325 mg by mouth Every Other Day., Disp: , Rfl:   •  Prenatal Vit-Fe Fumarate-FA (PRENATAL, CLASSIC, VITAMIN) 28-0.8 MG tablet tablet, Take 1 tablet by mouth Daily., Disp: , Rfl:   •  ibuprofen (ADVIL,MOTRIN) 600 MG tablet, Take 1 tablet by mouth Every 8 (Eight) Hours As Needed for Mild Pain ., Disp: 30 tablet, Rfl: 30  •  methylPREDNISolone (MEDROL, KAREN,) 4 MG tablet, Use as directed by package instructions, Disp: 21 tablet, Rfl: 0    Past Medical History:   Diagnosis Date   • Anemia    • Asthma     no inhaler   • Heart murmur     benign regurgitation   • Irritable bowel syndrome    • Migraine    • Seasonal allergies        Past Surgical History:   Procedure Laterality Date   •  SECTION N/A 2019    Procedure:  SECTION PRIMARY;  Surgeon: Jessica Jones MD;  Location: [facility] LABOR DELIVERY;  Service: Obstetrics/Gynecology   • EAR TUBES     • KNEE ARTHROSCOPY         Social History     Occupational History   • Not on file   Tobacco Use   • Smoking status: Never Smoker   • Smokeless tobacco: Never Used   Substance and Sexual Activity   • Alcohol use: No     Frequency: Never   • Drug use:  "No   • Sexual activity: Yes     Partners: Male      Social History     Social History Narrative   • Not on file       Family History   Problem Relation Age of Onset   • Hypertension Mother    • Diabetes Mother    • Lung disease Mother    • Heart disease Mother    • Cancer Paternal Uncle    • Clotting disorder Maternal Grandmother    • Cancer Maternal Grandfather    • Hypertension Maternal Grandfather    • Diabetes Paternal Grandmother    • Lung disease Paternal Grandmother    • Heart disease Paternal Grandmother    • Cancer Paternal Grandfather    • Hypertension Paternal Grandfather        Review of Systems:      Constitutional: Denies fever, shaking or chills   Eyes: Denies change in visual acuity   HEENT: Denies nasal congestion or sore throat   Respiratory: Denies cough or shortness of breath   Cardiovascular: Denies chest pain or edema  Endocrine: Denies tremors, palpitations, intolerance of heat or cold, polyuria, polydipsia.  GI: Denies abdominal pain, nausea, vomiting, bloody stools or diarrhea  : Denies frequency, urgency, incontinence, retention, or nocturia.  Musculoskeletal: Denies numbness, tingling or loss of motor function except as above  Integument: Denies rash, lesion or ulceration   Neurologic: Denies headache or focal weakness, deficits  Heme: Denies spontaneous or excessive bleeding, epistaxis, hematuria, melena, fatigue, enlarged or tender lymph nodes.      All other pertinent positives and negatives as noted above in HPI.    Physical Exam: 29 y.o. female  Vitals:    11/13/19 1421   Temp: 99.2 °F (37.3 °C)   TempSrc: Temporal   Weight: 68.9 kg (152 lb)   Height: 162.6 cm (64\")       General:  Patient is awake and alert.  Appears in no acute distress or discomfort.    Psych:  Affect and demeanor are appropriate.    Eyes:  Conjunctiva and sclera appear grossly normal.  Eyes track well and EOM seem to be intact.    Ears:  No gross abnormalities.  Hearing adequate for the exam.    Cardiovascular:  " Regular rate and rhythm.    Lungs:  Good chest expansion.  Breathing unlabored.    Extremities: Right shoulder is examined.  Skin is benign.  No atrophy or swelling.  Moderate anterior tenderness.  Her motion is much better today.  I can get her to essentially fully range.  She has significant pain when bringing her out into horizontal abduction and abduction/external rotation.  A relocation maneuver is uncomfortable for her as well though.  Again, basically all provocative testing including attempts at Neer, Nieves, speeds and Allerton's maneuvers are very painful for.  She has intact axillary nerve sensation.  Normal motor and sensory function in her lower arm and hand.  Palpable radial pulse.    Imaging: MRI the right shoulder is reviewed along with the associated report.  There appears to be a Bankart type anterior labral tear with some medialization.  No Hill-Sachs lesion.  There also appears to be a split tear of the biceps.    Assessment/Plan: Right shoulder Bankart type anterior labral tear and possible split tear of the biceps    Her MRI does show pathology.  Her pain is significant and I do not know if the pathology noted on her MRI necessarily accounts for all of her pain.  It is possible that it does.  It is also possible that addressing these lesions noted on her MRI may not cure her of her pain.  She definitely has some hyperlaxity and a component of multidirectional instability.  That hyperlaxity is likely to persist after the potential surgery as well.  I had a long conversation with her.  I told her that the lesion noted on her MRI would predispose her to recurrent anterior instability.  Given her history, it may be reasonable to consider fixing this.  On the other hand, my concern is the severity of her pain.  I am just not confident that I can explain the severity of her pain based on the MRI findings.  I think a trial of some therapy and a course of expectant management is very reasonable for  her.  She is adamant that therapy has never worked for her in the past.  She wants to get this fixed.    We had a thorough discussion regarding the risks, benefits and alternatives to an arthroscopic labral repair and non-surgical management versus surgery.  I explained that surgical risks include infection, hematoma, anchor related complications including failure of fixation, loosening, cutout of the anchors, chondrolysis,  re-tear necessitating revision surgery, persistent pain and/or loss of motion, iatrogenic nerve and/or blood vessel injury resulting in permanent weakness, numbness or dysfunction, RSD, DVT, PE, positioning related neuropraxia, and anesthesia related complications resulting in death.  We further discussed the possible need to address the biceps with a possible tenotomy versus tenodesis.  We discussed the fact that if the biceps demonstrates significant pathology in the groove that I would plan to perform a tenotomy which could result in rigoberto deformity or persistent pain, weakness or cramping.  We also discussed possible risks with a tenodesis as well including screw related complications including cutout, chondrolysis, failure of fixation with re-tear of the biceps, fracture and possible iatrogenic nerve injury.  The patient voiced understanding of the risks, benefits, and alternative forms of treatment that were discussed and the patient consents to proceed.    Zan Rios MD    11/13/2019

## 2019-11-19 ENCOUNTER — TELEPHONE (OUTPATIENT)
Dept: ORTHOPEDIC SURGERY | Facility: CLINIC | Age: 29
End: 2019-11-19

## 2019-11-19 PROBLEM — M25.311 SHOULDER INSTABILITY, RIGHT: Status: ACTIVE | Noted: 2019-11-19

## 2019-11-19 NOTE — TELEPHONE ENCOUNTER
Regarding: Complaint  Contact: [phone]  ----- Message from Mychart, Generic sent at 11/19/2019 11:48 AM EST -----    I’ve been waiting to hear from the surgery scheduler about my shoulder surgery with Dr. Rios since last Wednesday. I’ve called a few times and can never reach a person, just voicemail. I’ve left messages but haven’t received a call back. I understand there may be higher priority surgeries, but it’s frustrating to be left with no information and no other ways to get something scheduled. My phone number is [phone].

## 2019-11-19 NOTE — TELEPHONE ENCOUNTER
AK--can you please reach out to her ASAP?  AMB-  If AK is out, can you call her?  Witten, please let her know that I'm working on it...

## 2019-12-16 ENCOUNTER — APPOINTMENT (OUTPATIENT)
Dept: PREADMISSION TESTING | Facility: HOSPITAL | Age: 29
End: 2019-12-16

## 2019-12-16 VITALS
SYSTOLIC BLOOD PRESSURE: 132 MMHG | OXYGEN SATURATION: 100 % | RESPIRATION RATE: 16 BRPM | BODY MASS INDEX: 27.18 KG/M2 | HEIGHT: 64 IN | WEIGHT: 159.2 LBS | HEART RATE: 80 BPM | TEMPERATURE: 97.1 F | DIASTOLIC BLOOD PRESSURE: 80 MMHG

## 2019-12-16 LAB
ANION GAP SERPL CALCULATED.3IONS-SCNC: 12.4 MMOL/L (ref 5–15)
BUN BLD-MCNC: 13 MG/DL (ref 6–20)
BUN/CREAT SERPL: 20.6 (ref 7–25)
CALCIUM SPEC-SCNC: 9.5 MG/DL (ref 8.6–10.5)
CHLORIDE SERPL-SCNC: 105 MMOL/L (ref 98–107)
CO2 SERPL-SCNC: 24.6 MMOL/L (ref 22–29)
CREAT BLD-MCNC: 0.63 MG/DL (ref 0.57–1)
DEPRECATED RDW RBC AUTO: 43.7 FL (ref 37–54)
ERYTHROCYTE [DISTWIDTH] IN BLOOD BY AUTOMATED COUNT: 12.8 % (ref 12.3–15.4)
GFR SERPL CREATININE-BSD FRML MDRD: 111 ML/MIN/1.73
GLUCOSE BLD-MCNC: 109 MG/DL (ref 65–99)
HCT VFR BLD AUTO: 40.2 % (ref 34–46.6)
HGB BLD-MCNC: 13.4 G/DL (ref 12–15.9)
MCH RBC QN AUTO: 30.6 PG (ref 26.6–33)
MCHC RBC AUTO-ENTMCNC: 33.3 G/DL (ref 31.5–35.7)
MCV RBC AUTO: 91.8 FL (ref 79–97)
PLATELET # BLD AUTO: 313 10*3/MM3 (ref 140–450)
PMV BLD AUTO: 10 FL (ref 6–12)
POTASSIUM BLD-SCNC: 4.1 MMOL/L (ref 3.5–5.2)
RBC # BLD AUTO: 4.38 10*6/MM3 (ref 3.77–5.28)
SODIUM BLD-SCNC: 142 MMOL/L (ref 136–145)
WBC NRBC COR # BLD: 6.93 10*3/MM3 (ref 3.4–10.8)

## 2019-12-16 PROCEDURE — 80048 BASIC METABOLIC PNL TOTAL CA: CPT | Performed by: ORTHOPAEDIC SURGERY

## 2019-12-16 PROCEDURE — 85027 COMPLETE CBC AUTOMATED: CPT | Performed by: ORTHOPAEDIC SURGERY

## 2019-12-16 PROCEDURE — 36415 COLL VENOUS BLD VENIPUNCTURE: CPT

## 2019-12-16 NOTE — DISCHARGE INSTRUCTIONS
Take the following medications the morning of surgery:  NO MEDS      General Instructions:  • Do not eat solid food after midnight the night before surgery.  • You may drink clear liquids day of surgery but must stop at least one hour before your hospital arrival time.  • It is beneficial for you to have a clear drink that contains carbohydrates the day of surgery.  We suggest a 12 to 20 ounce bottle of Gatorade or Powerade for non-diabetic patients or a 12 to 20 ounce bottle of G2 or Powerade Zero for diabetic patients. (Pediatric patients, are not advised to drink a 12 to 20 ounce carbohydrate drink)    Clear liquids are liquids you can see through.  Nothing red in color.     Plain water                               Sports drinks  Sodas                                   Gelatin (Jell-O)  Fruit juices without pulp such as white grape juice and apple juice  Popsicles that contain no fruit or yogurt  Tea or coffee (no cream or milk added)  Gatorade / Powerade  G2 / Powerade Zero    • Infants may have breast milk up to four hours before surgery.  • Infants drinking formula may drink formula up to six hours before surgery.   • Patients who avoid smoking, chewing tobacco and alcohol for 4 weeks prior to surgery have a reduced risk of post-operative complications.  Quit smoking as many days before surgery as you can.  • Do not smoke, use chewing tobacco or drink alcohol the day of surgery.   • If applicable bring your C-PAP/ BI-PAP machine.  • Bring any papers given to you in the doctor’s office.  • Wear clean comfortable clothes.  • Do not wear contact lenses, false eyelashes or make-up.  Bring a case for your glasses.   • Bring crutches or walker if applicable.  • Remove all piercings.  Leave jewelry and any other valuables at home.  • Hair extensions with metal clips must be removed prior to surgery.  • The Pre-Admission Testing nurse will instruct you to bring medications if unable to obtain an accurate list in  Pre-Admission Testing.            Preventing a Surgical Site Infection:  • For 2 to 3 days before surgery, avoid shaving with a razor because the razor can irritate skin and make it easier to develop an infection.    • Any areas of open skin can increase the risk of a post-operative wound infection by allowing bacteria to enter and travel throughout the body.  Notify your surgeon if you have any skin wounds / rashes even if it is not near the expected surgical site.  The area will need assessed to determine if surgery should be delayed until it is healed.  • The night prior to surgery sleep in a clean bed with clean clothing.  Do not allow pets to sleep with you.  • Shower on the morning of surgery using a fresh bar of anti-bacterial soap (such as Dial) and clean washcloth.  Dry with a clean towel and dress in clean clothing.  • Ask your surgeon if you will be receiving antibiotics prior to surgery.  • Make sure you, your family, and all healthcare providers clean their hands with soap and water or an alcohol based hand  before caring for you or your wound.    Day of surgery:  Your arrival time is approximately two hours before your scheduled surgery time.  Upon arrival, a Pre-op nurse and Anesthesiologist will review your health history, obtain vital signs, and answer questions you may have.  The only belongings needed at this time will be a list of your home medications and if applicable your C-PAP/BI-PAP machine.  If you are staying overnight your family can leave the rest of your belongings in the car and bring them to your room later.  A Pre-op nurse will start an IV and you may receive medication in preparation for surgery, including something to help you relax.  Your family will be able to see you in the Pre-op area.  Two visitors at a time will be allowed in the Pre-op room.  While you are in surgery your family should notify the waiting room  if they leave the waiting room area and  provide a contact phone number.    Please be aware that surgery does come with discomfort.  We want to make every effort to control your discomfort so please discuss any uncontrolled symptoms with your nurse.   Your doctor will most likely have prescribed pain medications.      If you are going home after surgery you will receive individualized written care instructions before being discharged.  A responsible adult must drive you to and from the hospital on the day of your surgery and stay with you for 24 hours.    If you are staying overnight following surgery, you will be transported to your hospital room following the recovery period.  [facility] has all private rooms.    If you have any questions please call Pre-Admission Testing at [phone].  Deductibles and co-payments are collected on the day of service. Please be prepared to pay the required co-pay, deductible or deposit on the day of service as defined by your plan.

## 2019-12-31 ENCOUNTER — HOSPITAL ENCOUNTER (OUTPATIENT)
Facility: HOSPITAL | Age: 29
Setting detail: HOSPITAL OUTPATIENT SURGERY
Discharge: HOME | End: 2019-12-31
Attending: ORTHOPAEDIC SURGERY | Admitting: ORTHOPAEDIC SURGERY

## 2019-12-31 ENCOUNTER — ANESTHESIA (OUTPATIENT)
Dept: PERIOP | Facility: HOSPITAL | Age: 29
End: 2019-12-31

## 2019-12-31 ENCOUNTER — ANESTHESIA EVENT (OUTPATIENT)
Dept: PERIOP | Facility: HOSPITAL | Age: 29
End: 2019-12-31

## 2019-12-31 VITALS
OXYGEN SATURATION: 99 % | TEMPERATURE: 98.2 F | DIASTOLIC BLOOD PRESSURE: 79 MMHG | SYSTOLIC BLOOD PRESSURE: 113 MMHG | RESPIRATION RATE: 16 BRPM | HEART RATE: 77 BPM

## 2019-12-31 DIAGNOSIS — M25.311 SHOULDER INSTABILITY, RIGHT: ICD-10-CM

## 2019-12-31 LAB
B-HCG UR QL: NEGATIVE
INTERNAL NEGATIVE CONTROL: NEGATIVE
INTERNAL POSITIVE CONTROL: POSITIVE
Lab: NORMAL

## 2019-12-31 PROCEDURE — 25010000002 FENTANYL CITRATE (PF) 100 MCG/2ML SOLUTION: Performed by: NURSE ANESTHETIST, CERTIFIED REGISTERED

## 2019-12-31 PROCEDURE — C9290 INJ, BUPIVACAINE LIPOSOME: HCPCS | Performed by: ANESTHESIOLOGY

## 2019-12-31 PROCEDURE — L3670 SO ACRO/CLAV CAN WEB PRE OTS: HCPCS | Performed by: ORTHOPAEDIC SURGERY

## 2019-12-31 PROCEDURE — 25010000002 NEOSTIGMINE PER 0.5 MG: Performed by: NURSE ANESTHETIST, CERTIFIED REGISTERED

## 2019-12-31 PROCEDURE — 29806 SHO ARTHRS SRG CAPSULORRAPHY: CPT | Performed by: ORTHOPAEDIC SURGERY

## 2019-12-31 PROCEDURE — C1713 ANCHOR/SCREW BN/BN,TIS/BN: HCPCS | Performed by: ORTHOPAEDIC SURGERY

## 2019-12-31 PROCEDURE — 25010000002 EPINEPHRINE PER 0.1 MG: Performed by: ORTHOPAEDIC SURGERY

## 2019-12-31 PROCEDURE — 25010000002 DEXAMETHASONE PER 1 MG: Performed by: NURSE ANESTHETIST, CERTIFIED REGISTERED

## 2019-12-31 PROCEDURE — 25010000002 PROPOFOL 10 MG/ML EMULSION: Performed by: NURSE ANESTHETIST, CERTIFIED REGISTERED

## 2019-12-31 PROCEDURE — 25010000003 BUPIVACAINE LIPOSOME 1.3 % SUSPENSION: Performed by: ANESTHESIOLOGY

## 2019-12-31 PROCEDURE — 25010000002 FENTANYL CITRATE (PF) 100 MCG/2ML SOLUTION: Performed by: ANESTHESIOLOGY

## 2019-12-31 PROCEDURE — 81025 URINE PREGNANCY TEST: CPT | Performed by: ANESTHESIOLOGY

## 2019-12-31 PROCEDURE — 25010000002 ONDANSETRON PER 1 MG: Performed by: ANESTHESIOLOGY

## 2019-12-31 PROCEDURE — 25010000002 MIDAZOLAM PER 1MG: Performed by: ANESTHESIOLOGY

## 2019-12-31 PROCEDURE — 25010000002 ONDANSETRON PER 1 MG: Performed by: NURSE ANESTHETIST, CERTIFIED REGISTERED

## 2019-12-31 PROCEDURE — 25010000003 CEFAZOLIN IN DEXTROSE 2-4 GM/100ML-% SOLUTION: Performed by: ORTHOPAEDIC SURGERY

## 2019-12-31 DEVICE — SUT FIBERLINK BR PB NO2 W/CLSDLP 1.5IN: Type: IMPLANTABLE DEVICE | Site: SHOULDER | Status: FUNCTIONAL

## 2019-12-31 DEVICE — SUT/ANCH SHLDR BIOPUSHLOCK 2.9X15.5MM: Type: IMPLANTABLE DEVICE | Site: SHOULDER | Status: FUNCTIONAL

## 2019-12-31 RX ORDER — PROMETHAZINE HYDROCHLORIDE 25 MG/ML
6.25 INJECTION, SOLUTION INTRAMUSCULAR; INTRAVENOUS ONCE AS NEEDED
Status: DISCONTINUED | OUTPATIENT
Start: 2019-12-31 | End: 2019-12-31 | Stop reason: HOSPADM

## 2019-12-31 RX ORDER — DEXAMETHASONE SODIUM PHOSPHATE 10 MG/ML
INJECTION INTRAMUSCULAR; INTRAVENOUS AS NEEDED
Status: DISCONTINUED | OUTPATIENT
Start: 2019-12-31 | End: 2019-12-31 | Stop reason: SURG

## 2019-12-31 RX ORDER — SODIUM CHLORIDE, SODIUM LACTATE, POTASSIUM CHLORIDE, CALCIUM CHLORIDE 600; 310; 30; 20 MG/100ML; MG/100ML; MG/100ML; MG/100ML
9 INJECTION, SOLUTION INTRAVENOUS CONTINUOUS
Status: DISCONTINUED | OUTPATIENT
Start: 2019-12-31 | End: 2019-12-31 | Stop reason: HOSPADM

## 2019-12-31 RX ORDER — LIDOCAINE HYDROCHLORIDE 10 MG/ML
0.5 INJECTION, SOLUTION EPIDURAL; INFILTRATION; INTRACAUDAL; PERINEURAL ONCE AS NEEDED
Status: DISCONTINUED | OUTPATIENT
Start: 2019-12-31 | End: 2019-12-31 | Stop reason: HOSPADM

## 2019-12-31 RX ORDER — LIDOCAINE HYDROCHLORIDE 20 MG/ML
INJECTION, SOLUTION INFILTRATION; PERINEURAL AS NEEDED
Status: DISCONTINUED | OUTPATIENT
Start: 2019-12-31 | End: 2019-12-31 | Stop reason: SURG

## 2019-12-31 RX ORDER — PROMETHAZINE HYDROCHLORIDE 25 MG/1
25 TABLET ORAL ONCE AS NEEDED
Status: DISCONTINUED | OUTPATIENT
Start: 2019-12-31 | End: 2019-12-31 | Stop reason: HOSPADM

## 2019-12-31 RX ORDER — HYDROCODONE BITARTRATE AND ACETAMINOPHEN 7.5; 325 MG/1; MG/1
1 TABLET ORAL ONCE AS NEEDED
Status: DISCONTINUED | OUTPATIENT
Start: 2019-12-31 | End: 2019-12-31 | Stop reason: HOSPADM

## 2019-12-31 RX ORDER — FENTANYL CITRATE 50 UG/ML
100 INJECTION, SOLUTION INTRAMUSCULAR; INTRAVENOUS
Status: DISCONTINUED | OUTPATIENT
Start: 2019-12-31 | End: 2019-12-31 | Stop reason: HOSPADM

## 2019-12-31 RX ORDER — ONDANSETRON 4 MG/1
4 TABLET, FILM COATED ORAL EVERY 8 HOURS PRN
Qty: 30 TABLET | Refills: 0 | Status: SHIPPED | OUTPATIENT
Start: 2019-12-31 | End: 2020-08-03

## 2019-12-31 RX ORDER — ONDANSETRON 2 MG/ML
4 INJECTION INTRAMUSCULAR; INTRAVENOUS ONCE AS NEEDED
Status: COMPLETED | OUTPATIENT
Start: 2019-12-31 | End: 2019-12-31

## 2019-12-31 RX ORDER — DOCUSATE SODIUM 100 MG/1
100 CAPSULE, LIQUID FILLED ORAL 2 TIMES DAILY
Qty: 60 CAPSULE | Refills: 0 | Status: SHIPPED | OUTPATIENT
Start: 2019-12-31 | End: 2020-08-03

## 2019-12-31 RX ORDER — ROCURONIUM BROMIDE 10 MG/ML
INJECTION, SOLUTION INTRAVENOUS AS NEEDED
Status: DISCONTINUED | OUTPATIENT
Start: 2019-12-31 | End: 2019-12-31 | Stop reason: SURG

## 2019-12-31 RX ORDER — MIDAZOLAM HYDROCHLORIDE 1 MG/ML
2 INJECTION INTRAMUSCULAR; INTRAVENOUS
Status: DISCONTINUED | OUTPATIENT
Start: 2019-12-31 | End: 2019-12-31 | Stop reason: HOSPADM

## 2019-12-31 RX ORDER — HYDROMORPHONE HYDROCHLORIDE 1 MG/ML
0.5 INJECTION, SOLUTION INTRAMUSCULAR; INTRAVENOUS; SUBCUTANEOUS
Status: DISCONTINUED | OUTPATIENT
Start: 2019-12-31 | End: 2019-12-31 | Stop reason: HOSPADM

## 2019-12-31 RX ORDER — HYDRALAZINE HYDROCHLORIDE 20 MG/ML
5 INJECTION INTRAMUSCULAR; INTRAVENOUS
Status: DISCONTINUED | OUTPATIENT
Start: 2019-12-31 | End: 2019-12-31 | Stop reason: HOSPADM

## 2019-12-31 RX ORDER — FLUMAZENIL 0.1 MG/ML
0.2 INJECTION INTRAVENOUS AS NEEDED
Status: DISCONTINUED | OUTPATIENT
Start: 2019-12-31 | End: 2019-12-31 | Stop reason: HOSPADM

## 2019-12-31 RX ORDER — CEFAZOLIN SODIUM 2 G/100ML
2 INJECTION, SOLUTION INTRAVENOUS ONCE
Status: COMPLETED | OUTPATIENT
Start: 2019-12-31 | End: 2019-12-31

## 2019-12-31 RX ORDER — BUPIVACAINE HYDROCHLORIDE 2.5 MG/ML
INJECTION, SOLUTION EPIDURAL; INFILTRATION; INTRACAUDAL
Status: COMPLETED | OUTPATIENT
Start: 2019-12-31 | End: 2019-12-31

## 2019-12-31 RX ORDER — PROPOFOL 10 MG/ML
VIAL (ML) INTRAVENOUS AS NEEDED
Status: DISCONTINUED | OUTPATIENT
Start: 2019-12-31 | End: 2019-12-31 | Stop reason: SURG

## 2019-12-31 RX ORDER — OXYCODONE AND ACETAMINOPHEN 7.5; 325 MG/1; MG/1
1 TABLET ORAL EVERY 4 HOURS PRN
Status: DISCONTINUED | OUTPATIENT
Start: 2019-12-31 | End: 2019-12-31 | Stop reason: HOSPADM

## 2019-12-31 RX ORDER — ISOPROPYL ALCOHOL 70 ML/100ML
LIQUID TOPICAL AS NEEDED
Status: DISCONTINUED | OUTPATIENT
Start: 2019-12-31 | End: 2019-12-31 | Stop reason: HOSPADM

## 2019-12-31 RX ORDER — FAMOTIDINE 10 MG/ML
20 INJECTION, SOLUTION INTRAVENOUS ONCE
Status: COMPLETED | OUTPATIENT
Start: 2019-12-31 | End: 2019-12-31

## 2019-12-31 RX ORDER — SODIUM CHLORIDE 0.9 % (FLUSH) 0.9 %
3-10 SYRINGE (ML) INJECTION AS NEEDED
Status: DISCONTINUED | OUTPATIENT
Start: 2019-12-31 | End: 2019-12-31 | Stop reason: HOSPADM

## 2019-12-31 RX ORDER — ACETAMINOPHEN 650 MG
TABLET, EXTENDED RELEASE ORAL AS NEEDED
Status: DISCONTINUED | OUTPATIENT
Start: 2019-12-31 | End: 2019-12-31 | Stop reason: HOSPADM

## 2019-12-31 RX ORDER — SODIUM CHLORIDE 0.9 % (FLUSH) 0.9 %
3 SYRINGE (ML) INJECTION EVERY 12 HOURS SCHEDULED
Status: DISCONTINUED | OUTPATIENT
Start: 2019-12-31 | End: 2019-12-31 | Stop reason: HOSPADM

## 2019-12-31 RX ORDER — FENTANYL CITRATE 50 UG/ML
50 INJECTION, SOLUTION INTRAMUSCULAR; INTRAVENOUS
Status: DISCONTINUED | OUTPATIENT
Start: 2019-12-31 | End: 2019-12-31 | Stop reason: HOSPADM

## 2019-12-31 RX ORDER — ONDANSETRON 2 MG/ML
INJECTION INTRAMUSCULAR; INTRAVENOUS AS NEEDED
Status: DISCONTINUED | OUTPATIENT
Start: 2019-12-31 | End: 2019-12-31 | Stop reason: SURG

## 2019-12-31 RX ORDER — SODIUM CHLORIDE, SODIUM LACTATE, POTASSIUM CHLORIDE, CALCIUM CHLORIDE 600; 310; 30; 20 MG/100ML; MG/100ML; MG/100ML; MG/100ML
INJECTION, SOLUTION INTRAVENOUS CONTINUOUS PRN
Status: DISCONTINUED | OUTPATIENT
Start: 2019-12-31 | End: 2019-12-31 | Stop reason: SURG

## 2019-12-31 RX ORDER — EPHEDRINE SULFATE 50 MG/ML
5 INJECTION, SOLUTION INTRAVENOUS ONCE AS NEEDED
Status: DISCONTINUED | OUTPATIENT
Start: 2019-12-31 | End: 2019-12-31 | Stop reason: HOSPADM

## 2019-12-31 RX ORDER — HYDROCODONE BITARTRATE AND ACETAMINOPHEN 7.5; 325 MG/1; MG/1
1-2 TABLET ORAL EVERY 4 HOURS PRN
Qty: 42 TABLET | Refills: 0 | Status: SHIPPED | OUTPATIENT
Start: 2019-12-31 | End: 2020-01-08 | Stop reason: SDUPTHER

## 2019-12-31 RX ORDER — PROMETHAZINE HYDROCHLORIDE 25 MG/1
25 SUPPOSITORY RECTAL ONCE AS NEEDED
Status: DISCONTINUED | OUTPATIENT
Start: 2019-12-31 | End: 2019-12-31 | Stop reason: HOSPADM

## 2019-12-31 RX ORDER — GLYCOPYRROLATE 0.2 MG/ML
INJECTION INTRAMUSCULAR; INTRAVENOUS AS NEEDED
Status: DISCONTINUED | OUTPATIENT
Start: 2019-12-31 | End: 2019-12-31 | Stop reason: SURG

## 2019-12-31 RX ORDER — FENTANYL CITRATE 50 UG/ML
INJECTION, SOLUTION INTRAMUSCULAR; INTRAVENOUS AS NEEDED
Status: DISCONTINUED | OUTPATIENT
Start: 2019-12-31 | End: 2019-12-31 | Stop reason: SURG

## 2019-12-31 RX ORDER — SODIUM CHLORIDE, SODIUM LACTATE, POTASSIUM CHLORIDE, AND CALCIUM CHLORIDE .6; .31; .03; .02 G/100ML; G/100ML; G/100ML; G/100ML
IRRIGANT IRRIGATION AS NEEDED
Status: DISCONTINUED | OUTPATIENT
Start: 2019-12-31 | End: 2019-12-31 | Stop reason: HOSPADM

## 2019-12-31 RX ORDER — MIDAZOLAM HYDROCHLORIDE 1 MG/ML
1 INJECTION INTRAMUSCULAR; INTRAVENOUS
Status: DISCONTINUED | OUTPATIENT
Start: 2019-12-31 | End: 2019-12-31 | Stop reason: HOSPADM

## 2019-12-31 RX ADMIN — MIDAZOLAM HYDROCHLORIDE 2 MG: 1 INJECTION, SOLUTION INTRAMUSCULAR; INTRAVENOUS at 07:27

## 2019-12-31 RX ADMIN — BUPIVACAINE HYDROCHLORIDE 10 ML: 2.5 INJECTION, SOLUTION EPIDURAL; INFILTRATION; INTRACAUDAL; PERINEURAL at 07:34

## 2019-12-31 RX ADMIN — CEFAZOLIN SODIUM 2 G: 2 INJECTION, SOLUTION INTRAVENOUS at 08:39

## 2019-12-31 RX ADMIN — SODIUM CHLORIDE, POTASSIUM CHLORIDE, SODIUM LACTATE AND CALCIUM CHLORIDE: 600; 310; 30; 20 INJECTION, SOLUTION INTRAVENOUS at 08:39

## 2019-12-31 RX ADMIN — GLYCOPYRROLATE 0.6 MG: 0.2 INJECTION INTRAMUSCULAR; INTRAVENOUS at 09:41

## 2019-12-31 RX ADMIN — ROCURONIUM BROMIDE 40 MG: 10 INJECTION, SOLUTION INTRAVENOUS at 08:33

## 2019-12-31 RX ADMIN — FAMOTIDINE 20 MG: 10 INJECTION INTRAVENOUS at 07:20

## 2019-12-31 RX ADMIN — PROPOFOL 200 MG: 10 INJECTION, EMULSION INTRAVENOUS at 08:33

## 2019-12-31 RX ADMIN — DEXAMETHASONE SODIUM PHOSPHATE 8 MG: 10 INJECTION INTRAMUSCULAR; INTRAVENOUS at 08:50

## 2019-12-31 RX ADMIN — BUPIVACAINE 10 ML: 13.3 INJECTION, SUSPENSION, LIPOSOMAL INFILTRATION at 07:34

## 2019-12-31 RX ADMIN — ONDANSETRON HYDROCHLORIDE 4 MG: 2 SOLUTION INTRAMUSCULAR; INTRAVENOUS at 09:41

## 2019-12-31 RX ADMIN — Medication 4 MG: at 09:41

## 2019-12-31 RX ADMIN — FENTANYL CITRATE 50 MCG: 50 INJECTION INTRAMUSCULAR; INTRAVENOUS at 08:56

## 2019-12-31 RX ADMIN — ONDANSETRON 4 MG: 2 INJECTION INTRAMUSCULAR; INTRAVENOUS at 10:24

## 2019-12-31 RX ADMIN — FENTANYL CITRATE 100 MCG: 50 INJECTION, SOLUTION INTRAMUSCULAR; INTRAVENOUS at 07:27

## 2019-12-31 RX ADMIN — SODIUM CHLORIDE, POTASSIUM CHLORIDE, SODIUM LACTATE AND CALCIUM CHLORIDE: 600; 310; 30; 20 INJECTION, SOLUTION INTRAVENOUS at 09:55

## 2019-12-31 RX ADMIN — SODIUM CHLORIDE, POTASSIUM CHLORIDE, SODIUM LACTATE AND CALCIUM CHLORIDE 9 ML/HR: 600; 310; 30; 20 INJECTION, SOLUTION INTRAVENOUS at 07:09

## 2019-12-31 RX ADMIN — LIDOCAINE HYDROCHLORIDE 100 MG: 20 INJECTION, SOLUTION INFILTRATION; PERINEURAL at 08:33

## 2019-12-31 NOTE — ANESTHESIA POSTPROCEDURE EVALUATION
Patient: Farheen Chadwick    Procedure Summary     Date:  12/31/19 Room / Location:   [facility]  [facility]    Anesthesia Start:  0830 Anesthesia Stop:  0956    Procedure:  RIGHT SHOULDER ARTHROSCOPY WITH anterior labral repair (Right Shoulder) Diagnosis:       Shoulder instability, right      (Shoulder instability, right [M25.311])    Surgeon:  Zan Rios MD Provider:  Ruel Melendrez MD    Anesthesia Type:  general ASA Status:  2          Anesthesia Type: general    Vitals  Vitals Value Taken Time   /81 12/31/2019 10:30 AM   Temp 36.8 °C (98.2 °F) 12/31/2019 10:30 AM   Pulse 82 12/31/2019 10:35 AM   Resp 18 12/31/2019 10:30 AM   SpO2 98 % 12/31/2019 10:36 AM   Vitals shown include unvalidated device data.        Post Anesthesia Care and Evaluation    Patient location during evaluation: bedside  Patient participation: complete - patient participated  Level of consciousness: awake and alert  Pain management: adequate  Airway patency: patent  Anesthetic complications: No anesthetic complications    Cardiovascular status: acceptable  Respiratory status: acceptable  Hydration status: acceptable    Comments: /81   Pulse 66   Temp 36.8 °C (98.2 °F) (Oral)   Resp 18   LMP 12/05/2019 (Exact Date)   SpO2 97%

## 2019-12-31 NOTE — ANESTHESIA PROCEDURE NOTES
Airway  Urgency: elective    Date/Time: 12/31/2019 8:37 AM  End Time:12/31/2019 8:48 AM  Airway not difficult    General Information and Staff    Patient location during procedure: OR  Anesthesiologist: Ruel Melendrez MD  CRNA: Jasmin Lobato CRNA    Indications and Patient Condition  Indications for airway management: airway protection    Preoxygenated: yes  MILS maintained throughout  Mask difficulty assessment: 1 - vent by mask    Final Airway Details  Final airway type: endotracheal airway      Successful airway: ETT  Cuffed: yes   Successful intubation technique: direct laryngoscopy  Endotracheal tube insertion site: oral  Blade: Mcpherson  Blade size: 2  ETT size (mm): 7.0  Cormack-Lehane Classification: grade I - full view of glottis  Placement verified by: chest auscultation and capnometry   Measured from: lips  Number of attempts at approach: 1  Assessment: lips, teeth, and gum same as pre-op and atraumatic intubation    Additional Comments  Atraumatic, Secured after verification of placement

## 2019-12-31 NOTE — ANESTHESIA PREPROCEDURE EVALUATION
Anesthesia Evaluation     Patient summary reviewed and Nursing notes reviewed   NPO Solid Status: > 8 hours             Airway   Mallampati: II  TM distance: >3 FB  Neck ROM: full  no difficulty expected  Dental - normal exam     Pulmonary - normal exam   (+) asthma,  Cardiovascular - normal exam    (+) valvular problems/murmurs,       Neuro/Psych- negative ROS  GI/Hepatic/Renal/Endo - negative ROS     Musculoskeletal (-) negative ROS    Abdominal  - normal exam   Substance History - negative use     OB/GYN negative ob/gyn ROS         Other                        Anesthesia Plan    ASA 2     general   (Isb popc)  intravenous induction     Anesthetic plan, all risks, benefits, and alternatives have been provided, discussed and informed consent has been obtained with: patient.    Plan discussed with CRNA.

## 2019-12-31 NOTE — ANESTHESIA PROCEDURE NOTES
Peripheral Block    Pre-sedation assessment completed: 12/31/2019 7:22 AM    Patient reassessed immediately prior to procedure    Patient location during procedure: pre-op  Start time: 12/31/2019 7:22 AM  Stop time: 12/31/2019 7:34 AM  Reason for block: at surgeon's request and post-op pain management  Performed by  Anesthesiologist: Angel Minaya MD  Preanesthetic Checklist  Completed: patient identified, site marked, surgical consent, pre-op evaluation, timeout performed, IV checked, risks and benefits discussed and monitors and equipment checked  Prep:  Pt Position: supine  Sterile barriers:cap, gloves, mask and sterile barriers  Prep: ChloraPrep  Patient monitoring: blood pressure monitoring, continuous pulse oximetry and EKG  Procedure  Sedation:yes  Performed under: local infiltration  Guidance:ultrasound guided  ULTRASOUND INTERPRETATION.  Using ultrasound guidance a 22 G gauge needle was placed in close proximity to the brachial plexus nerve, at which point, under ultrasound guidance anesthetic was injected in the area of the nerve and spread of the anesthesia was seen on ultrasound in close proximity thereto.  There were no abnormalities seen on ultrasound; a digital image was taken; and the patient tolerated the procedure with no complications. Images:still images obtained    Laterality:right  Block Type:interscalene  Injection Technique:single-shot  Needle Type:echogenic  Needle Gauge:22 G  Resistance on Injection: less than 15 psi    Medications Used: bupivacaine liposome (EXPAREL) 1.3 % injection, 10 mL  bupivacaine PF (MARCAINE) 0.25 % injection, 10 mL  Med admintered at 12/31/2019 7:34 AM      Medications  Comment:exparel - 10cc  bup 0.25 % - 10cc    Post Assessment  Injection Assessment: negative aspiration for heme, no paresthesia on injection and incremental injection  Patient Tolerance:comfortable throughout block  Complications:no

## 2020-01-02 ENCOUNTER — TELEPHONE (OUTPATIENT)
Dept: ORTHOPEDIC SURGERY | Facility: CLINIC | Age: 30
End: 2020-01-02

## 2020-01-02 NOTE — TELEPHONE ENCOUNTER
Called patient post op and she is doing fine. She is scheduled for a f/u appt on 1/8 and was advised to call with any questions or problems.

## 2020-01-08 ENCOUNTER — OFFICE VISIT (OUTPATIENT)
Dept: ORTHOPEDIC SURGERY | Facility: CLINIC | Age: 30
End: 2020-01-08

## 2020-01-08 VITALS — WEIGHT: 159 LBS | TEMPERATURE: 97.7 F | HEIGHT: 64 IN | BODY MASS INDEX: 27.14 KG/M2

## 2020-01-08 DIAGNOSIS — M25.311 SHOULDER INSTABILITY, RIGHT: Primary | ICD-10-CM

## 2020-01-08 PROCEDURE — 99024 POSTOP FOLLOW-UP VISIT: CPT | Performed by: ORTHOPAEDIC SURGERY

## 2020-01-08 RX ORDER — HYDROCODONE BITARTRATE AND ACETAMINOPHEN 7.5; 325 MG/1; MG/1
1 TABLET ORAL EVERY 4 HOURS PRN
Qty: 42 TABLET | Refills: 0 | Status: SHIPPED | OUTPATIENT
Start: 2020-01-08 | End: 2020-01-15

## 2020-01-08 NOTE — PROGRESS NOTES
Farheen Chadwick : 1990 MRN: [id number] DATE: 2020    CC: 1 week s/p right shoulder anterior labral repair    HPI: Pt. returns to clinic today stating pain has been gradually improving.  Denies any wound problems including redness, drainage.  No fevers, chills, sweats.  Has been doing the pendulum exercises as instructed.  Reports compliance with use of the sling.    Vitals:    20 1621   Temp: 97.7 °F (36.5 °C)       Exam:  Wounds appear well-approximated without any erythema or drainage.  Arm and forearm soft.  Shoulder moves fluidly with pendulums.  Good motor and sensory function distally.  Palpable pulses with good cap refill.      Imaging   none    Impression:  1 week s/p right shoulder anterior labral repair     Plan:    1.  Sutures removed and replaced with steri-strips.  2.  Will begin PT per protocol--Rx given along with 2 copies of the appropriate rehab protocol.  3.  F/u in 3 weeks at which time we'll discontinue the sling and progress to full motion.  4.  Counseled the patient about appropriate activity modifications and restrictions, including no driving at this time.    Zan Rios MD

## 2020-01-09 ENCOUNTER — TREATMENT (OUTPATIENT)
Dept: PHYSICAL THERAPY | Facility: CLINIC | Age: 30
End: 2020-01-09

## 2020-01-09 DIAGNOSIS — Z98.890 HISTORY OF SHOULDER SURGERY: ICD-10-CM

## 2020-01-09 DIAGNOSIS — M25.311 SHOULDER INSTABILITY, RIGHT: Primary | ICD-10-CM

## 2020-01-09 PROCEDURE — 97140 MANUAL THERAPY 1/> REGIONS: CPT | Performed by: PHYSICAL THERAPIST

## 2020-01-09 PROCEDURE — 97161 PT EVAL LOW COMPLEX 20 MIN: CPT | Performed by: PHYSICAL THERAPIST

## 2020-01-09 PROCEDURE — 97014 ELECTRIC STIMULATION THERAPY: CPT | Performed by: PHYSICAL THERAPIST

## 2020-01-09 NOTE — PROGRESS NOTES
Physical Therapy Initial Evaluation and Plan of Care        Patient: Farheen Chadwick   : 1990  Visit Diagnoses:     ICD-10-CM ICD-9-CM   1. Shoulder instability, right M25.311 718.81   2. History of shoulder surgery Z98.890 V45.89     Referring practitioner: Zan Rios MD  Date of Initial Visit: 2020  Today's Date: 2020  Patient seen for 1 sessions           Subjective Questionnaire: QuickDASH: 84.09%      Subjective Evaluation    History of Present Illness  Date of onset: 2019  Mechanism of injury: Patient reports history of shoulder dislocations, most recent in Aug. 2019. Had surgery to stabilize in Dec. 2019.    PMH: right knee 2016 scope meniscus repair, cecarean section    Subjective comment: Patient reports recent right shoulder surgery.  Patient Occupation: Part time-; dance teacher 1night/wk   Precautions and Work Restrictions: Off work currently due to recent surgery.Quality of life: good    Pain  Current pain rating: 3  At best pain ratin  At worst pain ratin  Location: Right shoulder-anterior  Quality: sharp  Relieving factors: ice and medications  Aggravating factors: movement and sleeping  Progression: improved    Social Support  Lives in: one-story house  Lives with: spouse and young children    Hand dominance: right    Patient Goals  Patient goals for therapy: decreased pain, increased strength, independence with ADLs/IADLs, return to sport/leisure activities and return to work             Objective       Observations     Right Shoulder  Positive for edema and incision.     Additional Observation Details  Abduction sling    Palpation     Right   Hypertonic in the pectoralis major, pectoralis minor and upper trapezius. Tenderness of the anterior deltoid, biceps, pectoralis major, pectoralis minor and upper trapezius.     Tenderness     Right Shoulder  Tenderness in the AC joint and bicipital groove.     Passive Range of Motion     Right Shoulder    Flexion: 70 degrees   Extension: 10 degrees   Abduction: 30 degrees   External rotation 0°: 0 degrees   Internal rotation 0°: 30 degrees     Strength/Myotome Testing     Additional Strength Details  Deferred due to post-op status.    Tests     Additional Tests Details  Deferred due to post-op status.         Assessment & Plan     Assessment  Impairments: abnormal or restricted ROM, impaired physical strength and pain with function  Assessment details: Farheen Chadwick is a pleasant 29 y.o. female that presents with signs and symptoms consistent with the above diagnosis. She has right UQ muscle guarding and generalized pain.  Pt would benefit from skilled PT services in order to address listed impairments and increase tolerance to normal daily activities including ADLs, work and recreational activities.    Prognosis: good  Prognosis details: Pt is a good candidate for skilled PT intervention to restore functional AROM and strength to return to previous level of ADL's.    Functional Limitations: sleeping and uncomfortable because of pain  Goals  Plan Goals: Short Term (4-6 wks):  1. Patient to have increased right shoulder PROM WNLs to restore functional joint mobility and achieve symmetry with uninvolved side.  2. Patient will have increased right shoulder strength to 3+/5 to allow for increased joint stability and to decrease joint/soft tissue stresses.  3. Patient will have decreased UQ muscle guarding to allow for improved posture and functional mobility.  4. Patient will have increased GHJ mobility to WNLs to allow for restoration of normal joint mechanics and decrease joint/soft tissue stresses.    Long Term Goal (12 wks):  1.  Patient will have improved DASH score of 10% or less.  2.  Patient will reports decreased shoulder pain to 4/10 at worst to allow for restorative sleep and return to PLOF/Work/Sport/Leisure activities.  3.  Patient will be independent in performance of HEP for carryover upon  discharge from skilled PT services.  4.  Patient will have increased right shoulder strength to 5/5 to allow for increased joint stability and to decrease joint/soft tissue stresses.    Plan  Therapy options: will be seen for skilled physical therapy services  Planned modality interventions: TENS, ultrasound, cryotherapy, thermotherapy (hydrocollator packs), high voltage pulsed current (pain management) and electrical stimulation/Russian stimulation  Planned therapy interventions: manual therapy, soft tissue mobilization, strengthening, stretching, functional ROM exercises, joint mobilization and home exercise program  Frequency: 2x week  Duration in visits: 20  Treatment plan discussed with: patient  Plan details: Pt was educated on the importance of their HEP and their current need for continued skilled physical therapy. Patients goals and potential limitations were discussed and pt is in agreement with current plan of care and treatment emphasis.              Timed:  Manual Therapy:    10     mins  51673;  Therapeutic Exercise:    6     mins  21911;     Neuromuscular Jody:        mins  41954;    Therapeutic Activity:          mins  13752;     Gait Training:           mins  97680;     Ultrasound:          mins  34230;  Iontophoresis:          mins  71181;    Dry Needling:          mins;    Untimed:  Electrical Stimulation:    15     mins  95594 ( );  Mechanical Traction:         mins  24937;     Timed Treatment:   16   mins   Total Treatment:     60   mins    PT SIGNATURE: Carmelina Mantilla, PT   DATE TREATMENT INITIATED: 1/9/2020    Initial Certification  Certification Period: 4/8/2020  I certify that the therapy services are furnished while this patient is under my care.  The services outlined above are required by this patient, and will be reviewed every 90 days.     PHYSICIAN: Zan Rios MD      DATE:     Please sign and return via fax to [phone].. Thank you, [facility] Physical  Therapy.

## 2020-01-13 ENCOUNTER — TREATMENT (OUTPATIENT)
Dept: PHYSICAL THERAPY | Facility: CLINIC | Age: 30
End: 2020-01-13

## 2020-01-13 DIAGNOSIS — Z98.890 HISTORY OF SHOULDER SURGERY: ICD-10-CM

## 2020-01-13 DIAGNOSIS — M25.311 SHOULDER INSTABILITY, RIGHT: Primary | ICD-10-CM

## 2020-01-13 PROCEDURE — 97140 MANUAL THERAPY 1/> REGIONS: CPT | Performed by: PHYSICAL THERAPIST

## 2020-01-13 PROCEDURE — 97014 ELECTRIC STIMULATION THERAPY: CPT | Performed by: PHYSICAL THERAPIST

## 2020-01-13 PROCEDURE — 97110 THERAPEUTIC EXERCISES: CPT | Performed by: PHYSICAL THERAPIST

## 2020-01-13 NOTE — PROGRESS NOTES
Physical Therapy Daily Progress Note      Patient: Farheen Chadwick   : 1990  Treatment Diagnosis:     ICD-10-CM ICD-9-CM   1. Shoulder instability, right M25.311 718.81   2. History of shoulder surgery Z98.890 V45.89     Referring practitioner: Zan Rios MD  Date of Initial Visit: Type: THERAPY  Noted: 2020  Today's Date: 2020  Patient seen for 2 sessions         Farheen Chadwick reports:     Subjective   Patient reports her shoulder has been a little less painful and she was able to sleep better the past two nights.  States she is not having any difficulty with her HEP at this point.    Objective   See Exercise, Manual, and Modality Logs for complete treatment.       Assessment/Plan  Patient tolerated manual therapy well with decreased muscle guarding and decreased pain post treatment.  She was able to achieve the 90 degrees flexion and ER to neutral per protocol.  Modalities provided additional symptom relief.  Progress per Plan of Care           Timed:  Manual Therapy:    15     mins  27499;  Therapeutic Exercise:    10     mins  76248;     Neuromuscular Jody:        mins  71077;    Therapeutic Activity:          mins  43062;     Gait Training:           mins  87751;     Ultrasound:          mins  94925;    Iontophoresis:          mins  27547;  Dry Needling:          mins ;    Untimed:  Electrical Stimulation:    15     mins  12190 ( );  Mechanical Traction:         mins  72315;     Timed Treatment:  25    mins   Total Treatment:     45   mins    Carmelina Mantilla PT  Physical Therapist

## 2020-01-20 ENCOUNTER — TREATMENT (OUTPATIENT)
Dept: PHYSICAL THERAPY | Facility: CLINIC | Age: 30
End: 2020-01-20

## 2020-01-20 DIAGNOSIS — M25.311 SHOULDER INSTABILITY, RIGHT: Primary | ICD-10-CM

## 2020-01-20 DIAGNOSIS — Z98.890 HISTORY OF SHOULDER SURGERY: ICD-10-CM

## 2020-01-20 PROCEDURE — 97110 THERAPEUTIC EXERCISES: CPT | Performed by: PHYSICAL THERAPIST

## 2020-01-20 PROCEDURE — 97014 ELECTRIC STIMULATION THERAPY: CPT | Performed by: PHYSICAL THERAPIST

## 2020-01-20 PROCEDURE — 97140 MANUAL THERAPY 1/> REGIONS: CPT | Performed by: PHYSICAL THERAPIST

## 2020-01-20 NOTE — PROGRESS NOTES
Physical Therapy Daily Progress Note      Patient: Farheen Chadwick   : 1990  Treatment Diagnosis:     ICD-10-CM ICD-9-CM   1. Shoulder instability, right M25.311 718.81   2. History of shoulder surgery Z98.890 V45.89     Referring practitioner: Zan Rios MD  Date of Initial Visit: Type: THERAPY  Noted: 2020  Today's Date: 2020  Patient seen for 3 sessions         Farheen Chadwick reports:     Subjective   Patient reports her shoulder is feeling less painful but her neck and elbow have been hurting due to the sling.    Objective   See Exercise, Manual, and Modality Logs for complete treatment.       Assessment/Plan  Patient was educated on the progression of restrictions per post-op protocol.    Assisted her with sling adjustments.  Progressed exercises and instructed in restricted ROM active assisted pendulum exercises.  She was able to perform exercises without complaints.  Will progress as tolerated/per protocol.  Progress per Plan of Care           Timed:  Manual Therapy:    13     mins  01943;  Therapeutic Exercise:    10     mins  35704;     Neuromuscular Jody:        mins  12259;    Therapeutic Activity:          mins  85409;     Gait Training:           mins  12865;     Ultrasound:          mins  93883;    Iontophoresis:          mins  16908;  Dry Needling:          mins ;    Untimed:  Electrical Stimulation:    15     mins  26974 ( );  Mechanical Traction:         mins  05433;     Timed Treatment:   23   mins   Total Treatment:     40   mins    Carmelina Mantilla PT  Physical Therapist

## 2020-01-27 ENCOUNTER — TREATMENT (OUTPATIENT)
Dept: PHYSICAL THERAPY | Facility: CLINIC | Age: 30
End: 2020-01-27

## 2020-01-27 DIAGNOSIS — Z98.890 HISTORY OF SHOULDER SURGERY: ICD-10-CM

## 2020-01-27 DIAGNOSIS — M25.311 SHOULDER INSTABILITY, RIGHT: Primary | ICD-10-CM

## 2020-01-27 PROCEDURE — 97014 ELECTRIC STIMULATION THERAPY: CPT | Performed by: PHYSICAL THERAPIST

## 2020-01-27 PROCEDURE — 97140 MANUAL THERAPY 1/> REGIONS: CPT | Performed by: PHYSICAL THERAPIST

## 2020-01-27 NOTE — PROGRESS NOTES
Physical Therapy Daily Progress Note      Patient: Farheen Chadwick   : 1990  Treatment Diagnosis:     ICD-10-CM ICD-9-CM   1. Shoulder instability, right M25.311 718.81   2. History of shoulder surgery Z98.890 V45.89     Referring practitioner: Zan Rios MD  Date of Initial Visit: Type: THERAPY  Noted: 2020  Today's Date: 2020  Patient seen for 4 sessions         Farheen Chadwick reports:       Subjective   Patient reports she started having increased pain and tightness in her shoulder last week on Wednesday.  States the pain continued over the weekend to the point that she couldn't do her exercises.  States she used ice a couple time per day which helped some.    Objective   See Exercise, Manual, and Modality Logs for complete treatment.       Assessment/Plan  Patient presents with significant muscle guarding in her right shoulder girdle.  She responded well to manual therapy to release scapular muscles posteriorly as well as pectoral muscles.  She was able to perform shoulder pulleys passively with modified technique in standing.  Encouraged her to perform pendulum and shoulder pulleys 6x/day followed by ice for at least 10 minutes to reduce pain and muscle guarding and allow progression of ROM.  Progress per Plan of Care           Timed:  Manual Therapy:    20     mins  17860;  Therapeutic Exercise:    5     mins  52591;     Neuromuscular Jody:        mins  87634;    Therapeutic Activity:          mins  74821;     Gait Training:           mins  46204;     Ultrasound:          mins  04155;    Iontophoresis:          mins  33254;  Dry Needling:          mins ;    Untimed:  Electrical Stimulation:    15     mins  08957 ( );  Mechanical Traction:         mins  54555;     Timed Treatment:   25   mins   Total Treatment:     45   mins    Carmelina Mantilla PT  Physical Therapist

## 2020-02-03 ENCOUNTER — OFFICE VISIT (OUTPATIENT)
Dept: ORTHOPEDIC SURGERY | Facility: CLINIC | Age: 30
End: 2020-02-03

## 2020-02-03 ENCOUNTER — TREATMENT (OUTPATIENT)
Dept: PHYSICAL THERAPY | Facility: CLINIC | Age: 30
End: 2020-02-03

## 2020-02-03 VITALS — WEIGHT: 159 LBS | TEMPERATURE: 98.7 F | BODY MASS INDEX: 27.14 KG/M2 | HEIGHT: 64 IN

## 2020-02-03 DIAGNOSIS — Z09 SURGERY FOLLOW-UP: Primary | ICD-10-CM

## 2020-02-03 DIAGNOSIS — M25.311 SHOULDER INSTABILITY, RIGHT: Primary | ICD-10-CM

## 2020-02-03 DIAGNOSIS — Z98.890 HISTORY OF SHOULDER SURGERY: ICD-10-CM

## 2020-02-03 PROCEDURE — 97014 ELECTRIC STIMULATION THERAPY: CPT | Performed by: PHYSICAL THERAPIST

## 2020-02-03 PROCEDURE — 99024 POSTOP FOLLOW-UP VISIT: CPT | Performed by: ORTHOPAEDIC SURGERY

## 2020-02-03 PROCEDURE — 97110 THERAPEUTIC EXERCISES: CPT | Performed by: PHYSICAL THERAPIST

## 2020-02-03 PROCEDURE — 97140 MANUAL THERAPY 1/> REGIONS: CPT | Performed by: PHYSICAL THERAPIST

## 2020-02-03 RX ORDER — TRAMADOL HYDROCHLORIDE 50 MG/1
50 TABLET ORAL EVERY 4 HOURS PRN
Qty: 60 TABLET | Refills: 0 | Status: SHIPPED | OUTPATIENT
Start: 2020-02-03 | End: 2020-08-03

## 2020-02-03 NOTE — PROGRESS NOTES
Farheen Chadwick : 1990 MRN: [id number] DATE: 2/3/2020    CC: 1 month s/p right shoulder anterior labral repair    Vitals:    20 0806   Temp: 98.7 °F (37.1 °C)       HPI:  Pt. returns to clinic today stating pain has been gradually improving.  Reports motion is progressing with therapy but she has only been a couple of times.  She was concerned about the cost and so she only scheduled a handful of visits.  She missed 1 of the visit because she says she was sick.  Reports compliance with use of the immobilizer as well as physical therapy.      Exam:  Wounds appear well-healed.  Arm and forearm soft.  Shoulder moves fluidly with pendulums.  Motion is behind where I would want her to be at this point.  Good motor and sensory function distally.  Palpable pulses with good cap refill.      Imaging   none    Impression:  1 month s/p right shoulder anterior labral repair     Plan:    1.  She needs to increase the frequency of her therapy to at least twice weekly and I would recommend that she go up to 3 times weekly.  2.  Continue PT per protocol.  I also demonstrated home exercises for her.  She needs to be diligent about working on those.  3.  Follow up in 6 weeks.  4.  I counseled the patient about appropriate activity modifications and restrictions--released to drive at this time.  5.  I did agree to refill her tramadol.    Zan Rios MD  2020

## 2020-02-03 NOTE — PROGRESS NOTES
Physical Therapy Daily Progress Note      Patient: Farheen Chadwick   : 1990  Treatment Diagnosis:     ICD-10-CM ICD-9-CM   1. Shoulder instability, right M25.311 718.81   2. History of shoulder surgery Z98.890 V45.89     Referring practitioner: Zan Rios MD  Date of Initial Visit: Type: THERAPY  Noted: 2020  Today's Date: 2/3/2020  Patient seen for 5 sessions         Farheen Chadwick reports:     Subjective   Patient reports her shoulder is still very painful.  Reports she saw the doctor and has been released from the sling.  Reports they have not done the PROM exercises with her  helping.    Objective   See Exercise, Manual, and Modality Logs for complete treatment.       Assessment/Plan  Patient presents with a lot of guarding in the right shoulder girdle and UE.  She was able to perform progressed exercises to increase ROM.  Progressed HEP and instructed patient with return demonstration for her  to assist with PROM.  Encouraged patient to perform exercises in small sessions several times per day followed by ice.  Progress per Plan of Care           Timed:  Manual Therapy:    13     mins  46018 (10 min concurrently);  Therapeutic Exercise:    17     mins  18522;     Neuromuscular Jody:        mins  08497;    Therapeutic Activity:          mins  34906;     Gait Training:           mins  59263;     Ultrasound:          mins  38648;    Iontophoresis:          mins  77470;  Dry Needling:          mins ;    Untimed:  Electrical Stimulation:    15     mins  88995 ( );  Mechanical Traction:         mins  57830;     Timed Treatment:   30   mins   Total Treatment:     60   mins    Carmelina Mantilla PT  Physical Therapist

## 2020-02-04 RX ORDER — TRAMADOL HYDROCHLORIDE 50 MG/1
50 TABLET ORAL EVERY 6 HOURS PRN
Qty: 50 TABLET | Refills: 0 | Status: SHIPPED | OUTPATIENT
Start: 2020-02-04 | End: 2020-08-03

## 2020-02-04 NOTE — TELEPHONE ENCOUNTER
SHANEL WORKED ON THIS TODAY. SENT THIS TO THE PLAN DETERMINATION WITH [employer]. WAITING ON RESPONSE

## 2020-02-04 NOTE — TELEPHONE ENCOUNTER
CALLED PATIENT AND STATED HER PA WAS STARTED JUST WAITING ON THE DETERMINATION FROM HER INSURANCE.

## 2020-02-04 NOTE — TELEPHONE ENCOUNTER
CALLED AND SPOKE WITH THE INSURANCE COMPANY.  THEY DENIED HER TRAMADOL BECAUSE OF QUANTITY.    SHE CAN GET ONLY GET 14 PILLS FOR A 7 DAY SUPPLY TWICE IN 30 DAY.S

## 2020-02-05 ENCOUNTER — TREATMENT (OUTPATIENT)
Dept: PHYSICAL THERAPY | Facility: CLINIC | Age: 30
End: 2020-02-05

## 2020-02-05 DIAGNOSIS — M25.311 SHOULDER INSTABILITY, RIGHT: Primary | ICD-10-CM

## 2020-02-05 DIAGNOSIS — Z98.890 HISTORY OF SHOULDER SURGERY: ICD-10-CM

## 2020-02-05 PROCEDURE — 97140 MANUAL THERAPY 1/> REGIONS: CPT | Performed by: PHYSICAL THERAPIST

## 2020-02-05 PROCEDURE — 97110 THERAPEUTIC EXERCISES: CPT | Performed by: PHYSICAL THERAPIST

## 2020-02-06 NOTE — PROGRESS NOTES
Physical Therapy Daily Progress Note      Patient: Farheen Chadwick   : 1990  Treatment Diagnosis:     ICD-10-CM ICD-9-CM   1. Shoulder instability, right M25.311 718.81   2. History of shoulder surgery Z98.890 V45.89     Referring practitioner: Zan Rios MD  Date of Initial Visit: Type: THERAPY  Noted: 2020  Today's Date: 2020  Patient seen for 6 sessions         Farheen Chadwick reports:     Subjective   Patient reports her shoulder is feeling better since last session and she has been doing her home exercises and her  is helping her range her shoulder.    Objective   See Exercise, Manual, and Modality Logs for complete treatment.       Assessment/Plan  Patient had improved ROM with decreased muscle guarding.  She continues to have limitations with mobility and pain limited tolerance.  Progressed exercises and HEP.  Deferred modalities this session due to patient being limited on time.  Progress per Plan of Care           Timed:  Manual Therapy:    17     mins  73451;  Therapeutic Exercise:    13     mins  60111 (9 minutes concurrently);     Neuromuscular Jody:        mins  26426;    Therapeutic Activity:          mins  39678;     Gait Training:           mins  01908;     Ultrasound:          mins  06534;    Iontophoresis:          mins  98280;  Dry Needling:          mins ;    Untimed:  Electrical Stimulation:         mins  34147 ( );  Mechanical Traction:         mins  85980;     Timed Treatment:  30    mins   Total Treatment:     39   mins    Carmelina Mantilla PT  Physical Therapist

## 2020-02-10 ENCOUNTER — TREATMENT (OUTPATIENT)
Dept: PHYSICAL THERAPY | Facility: CLINIC | Age: 30
End: 2020-02-10

## 2020-02-10 DIAGNOSIS — M25.311 SHOULDER INSTABILITY, RIGHT: Primary | ICD-10-CM

## 2020-02-10 DIAGNOSIS — Z98.890 HISTORY OF SHOULDER SURGERY: ICD-10-CM

## 2020-02-10 PROCEDURE — 97140 MANUAL THERAPY 1/> REGIONS: CPT | Performed by: PHYSICAL THERAPIST

## 2020-02-10 PROCEDURE — 97110 THERAPEUTIC EXERCISES: CPT | Performed by: PHYSICAL THERAPIST

## 2020-02-10 PROCEDURE — 97014 ELECTRIC STIMULATION THERAPY: CPT | Performed by: PHYSICAL THERAPIST

## 2020-02-10 NOTE — PROGRESS NOTES
Physical Therapy Re-Assessment / Re-Certification        Patient: Farheen Chadwick   : 1990  Visit Diagnoses:     ICD-10-CM ICD-9-CM   1. Shoulder instability, right M25.311 718.81   2. History of shoulder surgery Z98.890 V45.89     Referring practitioner: Zan Rios MD  Date of Initial Visit: Type: THERAPY  Noted: 2020  Today's Date: 2/10/2020  Patient seen for 7 sessions      Subjective:   Farheen Chadwick reports:   Subjective Questionnaire: QuickDASH: 31.82%  Clinical Progress: improved  Home Program Compliance: Yes  Treatment has included: therapeutic exercise, manual therapy, electrical stimulation and cryotherapy    Subjective   Patient reports she has been having less pain and is starting to have more movement going up in front but going out to the side is still very limited.  States she is doing better with her home exercises.    Objective       Active Range of Motion     Right Shoulder   Flexion: 115 (AAROM) degrees   Extension: 40 degrees   Abduction: 95 (AAROM) degrees   External rotation 45°: 25 (AAROM) degrees   Internal rotation 45°: 25 degrees     Strength/Myotome Testing     Right Shoulder     Planes of Motion   Flexion: 3   Extension: 4-   Abduction: 3-   Adduction: 4   External rotation at 0°: 3-            Assessment/Plan  Patient presents with improved ROM and strength in her right shoulder.  She is still having a significant amount of guarding in the right shoulder girdle.  She is TTP in the right RC, scapular and most prominently in the axillary region.  She is progressing with treatment and will benefit from continued PT.  Progressed HEP with shoulder ER on pulleys and active ROM.  Progress toward previous goals: Partially Met    Goal Review  Short Term (4 wks):  1. Patient to have increased right shoulder PROM WNLs to restore functional joint mobility and achieve symmetry with uninvolved side.  2. Patient will have increased right shoulder strength to 3+/5 to allow  for increased joint stability and to decrease joint/soft tissue stresses.  3. Patient will have decreased UQ muscle guarding to allow for improved posture and functional mobility.  4. Patient will have increased GHJ mobility to WNLs to allow for restoration of normal joint mechanics and decrease joint/soft tissue stresses.    Long Term Goal (8 wks):  1.  Patient will have improved DASH score of 10% or less.  2.  Patient will reports decreased shoulder pain to 4/10 at worst to allow for restorative sleep and return to PLOF/Work/Sport/Leisure activities.  3.  Patient will be independent in performance of HEP for carryover upon discharge from skilled PT services.  4.  Patient will have increased right shoulder strength to 5/5 to allow for increased joint stability and to decrease joint/soft tissue stresses.      Recommendations: Continue as planned  Timeframe: 1 month  Prognosis to achieve goals: good    PT Signature: Carmelina Mantilla, PT      Based upon review of the patient's progress and continued therapy plan, it is my medical opinion that Farheen Chadwick should continue physical therapy treatment at [facility]  [address] [place] [id number] [phone].    Signature: __________________________________  Zan Rios MD    Timed:  Manual Therapy:    13     mins  64057;  Therapeutic Exercise:    15     mins  27528;     Neuromuscular Jody:        mins  21271;    Therapeutic Activity:          mins  30869;     Gait Training:           mins  91501;     Ultrasound:          mins  70523;  Iontophoresis:          mins  52106;    Dry Needling:          mins ;    Untimed:  Electrical Stimulation:    15     mins  04277 ( );  Mechanical Traction:         mins  30818;     Timed Treatment:   28   mins   Total Treatment:     45   mins

## 2020-02-13 ENCOUNTER — TREATMENT (OUTPATIENT)
Dept: PHYSICAL THERAPY | Facility: CLINIC | Age: 30
End: 2020-02-13

## 2020-02-13 DIAGNOSIS — M25.311 SHOULDER INSTABILITY, RIGHT: Primary | ICD-10-CM

## 2020-02-13 DIAGNOSIS — Z98.890 HISTORY OF SHOULDER SURGERY: ICD-10-CM

## 2020-02-13 PROCEDURE — 97014 ELECTRIC STIMULATION THERAPY: CPT | Performed by: PHYSICAL THERAPIST

## 2020-02-13 PROCEDURE — 97110 THERAPEUTIC EXERCISES: CPT | Performed by: PHYSICAL THERAPIST

## 2020-02-13 PROCEDURE — 97140 MANUAL THERAPY 1/> REGIONS: CPT | Performed by: PHYSICAL THERAPIST

## 2020-02-13 NOTE — PROGRESS NOTES
Physical Therapy Daily Progress Note      Patient: Farheen Chadwick   : 1990  Treatment Diagnosis:     ICD-10-CM ICD-9-CM   1. Shoulder instability, right M25.311 718.81   2. History of shoulder surgery Z98.890 V45.89     Referring practitioner: Zan Rios MD  Date of Initial Visit: Type: THERAPY  Noted: 2020  Today's Date: 2020  Patient seen for 8 sessions         Farheen Chadwick reports:     Subjective   Patient reports her shoulder has been feeling better but still tight and weak.    Objective   See Exercise, Manual, and Modality Logs for complete treatment.       Assessment/Plan  Patient performed progressed exercises with no adverse symptoms.  She is responding well to manual therapy with improved ROM and decreased muscle guarding.  Modalities post exercise provide symptom relief.  Progress per Plan of Care           Timed:  Manual Therapy:    15     mins  16132;  Therapeutic Exercise:    13     mins  91509;     Neuromuscular Jody:        mins  89629;    Therapeutic Activity:          mins  99610;     Gait Training:           mins  56090;     Ultrasound:          mins  77518;    Iontophoresis:          mins  50649;  Dry Needling:          mins ;    Untimed:  Electrical Stimulation:    15     mins  43183 ( );  Mechanical Traction:         mins  13897;     Timed Treatment:   28   mins   Total Treatment:     45   mins    Carmelina Mantilla PT  Physical Therapist

## 2020-02-18 ENCOUNTER — TREATMENT (OUTPATIENT)
Dept: PHYSICAL THERAPY | Facility: CLINIC | Age: 30
End: 2020-02-18

## 2020-02-18 DIAGNOSIS — Z98.890 HISTORY OF SHOULDER SURGERY: ICD-10-CM

## 2020-02-18 DIAGNOSIS — M25.311 SHOULDER INSTABILITY, RIGHT: Primary | ICD-10-CM

## 2020-02-18 PROCEDURE — 97110 THERAPEUTIC EXERCISES: CPT | Performed by: PHYSICAL THERAPIST

## 2020-02-18 PROCEDURE — 97140 MANUAL THERAPY 1/> REGIONS: CPT | Performed by: PHYSICAL THERAPIST

## 2020-02-18 PROCEDURE — 97014 ELECTRIC STIMULATION THERAPY: CPT | Performed by: PHYSICAL THERAPIST

## 2020-02-18 NOTE — PROGRESS NOTES
Physical Therapy Daily Progress Note      Patient: Farheen Chadwick   : 1990  Treatment Diagnosis:     ICD-10-CM ICD-9-CM   1. Shoulder instability, right M25.311 718.81   2. History of shoulder surgery Z98.890 V45.89     Referring practitioner: Zan Rios MD  Date of Initial Visit: Type: THERAPY  Noted: 2020  Today's Date: 2020  Patient seen for 9 sessions         Farheen Chadwick reports:       Subjective   Patient reports her shoulder has been less painful and she feels she is making progress with her home stretches.    Objective   See Exercise, Manual, and Modality Logs for complete treatment.       Assessment/Plan  Patient is progressing with ROM although capsular limitations continue.  She has increased tolerance to stretching with less pain symptoms.  Progressed HEP, stretching and strengthening program.  Modalities continue to provide pain relief post treatment.  Progress per Plan of Care           Timed:  Manual Therapy:    17     mins  64962;  Therapeutic Exercise:    13     mins  30991 (10 min concurrently);     Neuromuscular Jody:        mins  43595;    Therapeutic Activity:          mins  96055;     Gait Training:           mins  21573;     Ultrasound:          mins  34661;    Iontophoresis:          mins  03170;  Dry Needling:          mins ;    Untimed:  Electrical Stimulation:    15     mins  39763 ( );  Mechanical Traction:         mins  44950;     Timed Treatment:   30   mins   Total Treatment:     60   mins    Carmelina Mantilla PT  Physical Therapist

## 2020-02-20 ENCOUNTER — TREATMENT (OUTPATIENT)
Dept: PHYSICAL THERAPY | Facility: CLINIC | Age: 30
End: 2020-02-20

## 2020-02-20 DIAGNOSIS — M25.311 SHOULDER INSTABILITY, RIGHT: Primary | ICD-10-CM

## 2020-02-20 DIAGNOSIS — Z98.890 HISTORY OF SHOULDER SURGERY: ICD-10-CM

## 2020-02-20 PROCEDURE — 97110 THERAPEUTIC EXERCISES: CPT | Performed by: PHYSICAL THERAPIST

## 2020-02-20 PROCEDURE — 97140 MANUAL THERAPY 1/> REGIONS: CPT | Performed by: PHYSICAL THERAPIST

## 2020-02-21 NOTE — PROGRESS NOTES
Physical Therapy Re-Assessment / Re-Certification        Patient: Farheen Chadwick   : 1990  Visit Diagnoses:     ICD-10-CM ICD-9-CM   1. Shoulder instability, right M25.311 718.81   2. History of shoulder surgery Z98.890 V45.89     Referring practitioner: Zan Rios MD  Date of Initial Visit: Type: THERAPY  Noted: 2020  Today's Date: 2020  Patient seen for 10 sessions      Subjective:   Farheen Chadwick reports:   Subjective Questionnaire: QuickDASH: 31.82%  Clinical Progress: improved  Home Program Compliance: Yes  Treatment has included: therapeutic exercise, manual therapy, electrical stimulation and cryotherapy    Subjective   Patient reports she has been having less pain and has improved motions.  States she has been able to use her arm easier with ADLs.    Objective     Active Range of Motion     Right Shoulder   Flexion: 125 (AAROM) degrees   Extension: 40 degrees   Abduction: 95 (AAROM) degrees   External rotation 45°: 25 (AAROM) degrees   Internal rotation 45°: 25 degrees      Strength/Myotome Testing     Right Shoulder      Planes of Motion   Flexion: 3   Extension: 4-   Abduction: 3-   Adduction: 4   External rotation at 0°: 3-           Assessment/Plan  Right shoulder ROM is progressing as well as strength.  She continues to have capsular tightness and impingement signs. She has decreased pain and increased tolerance to stretching.  Her DASH score has improved from 84.09% to 31.82%.  She is progressing towards goals and requires additional PT to complete post-op rehab to restore normal function.  Progress toward previous goals: Partially Met    Goal Review  Short Term (2-3 wks):  1. Patient to have increased right shoulder AROM WNLs to restore functional joint mobility.  2. Patient will have increased right shoulder strength to 3+/5 to allow for increased joint stability and to decrease joint/soft tissue stresses.  3. Patient will have decreased UQ muscle guarding to allow  for improved posture and functional mobility.  4. Patient will have increased GHJ mobility to WNLs to allow for restoration of normal joint mechanics and decrease joint/soft tissue stresses.    Long Term Goal (4 wks):  1.  Patient will have improved DASH score of 10% or less.  2.  Patient will reports decreased shoulder pain to 4/10 at worst to allow for restorative sleep and return to PLOF/Work/Sport/Leisure activities.  3.  Patient will be independent in performance of HEP for carryover upon discharge from skilled PT services.  4.  Patient will have increased right shoulder strength to 5/5 to allow for increased joint stability and to decrease joint/soft tissue stresses.      Recommendations: Continue as planned  Timeframe: 1 month  Prognosis to achieve goals: good    PT Signature: Carmelina Mantilla, PT      Based upon review of the patient's progress and continued therapy plan, it is my medical opinion that Farheen Chadwick should continue physical therapy treatment at [facility]  [address] [place] [id number] [phone].    Signature: __________________________________  Zan Rios MD    Timed:  Manual Therapy:    17     mins  67603;  Therapeutic Exercise:    13     mins  19352 (10 min concurrently);     Neuromuscular Jody:        mins  87784;    Therapeutic Activity:          mins  71590;     Gait Training:           mins  21561;     Ultrasound:          mins  28997;  Iontophoresis:          mins  55862;    Dry Needling:          mins ;    Untimed:  Electrical Stimulation:         mins  46259 ( );  Mechanical Traction:         mins  26297;     Timed Treatment:   30   mins   Total Treatment:     55   mins

## 2020-02-24 ENCOUNTER — TREATMENT (OUTPATIENT)
Dept: PHYSICAL THERAPY | Facility: CLINIC | Age: 30
End: 2020-02-24

## 2020-02-24 DIAGNOSIS — M25.311 SHOULDER INSTABILITY, RIGHT: Primary | ICD-10-CM

## 2020-02-24 DIAGNOSIS — Z98.890 HISTORY OF SHOULDER SURGERY: ICD-10-CM

## 2020-02-24 PROCEDURE — 97110 THERAPEUTIC EXERCISES: CPT | Performed by: PHYSICAL THERAPIST

## 2020-02-24 PROCEDURE — 97140 MANUAL THERAPY 1/> REGIONS: CPT | Performed by: PHYSICAL THERAPIST

## 2020-02-24 NOTE — PROGRESS NOTES
Physical Therapy Daily Progress Note      Patient: Farheen Chadwick   : 1990  Treatment Diagnosis:     ICD-10-CM ICD-9-CM   1. Shoulder instability, right M25.311 718.81   2. History of shoulder surgery Z98.890 V45.89     Referring practitioner: Zan Rios MD  Date of Initial Visit: Type: THERAPY  Noted: 2020  Today's Date: 2020  Patient seen for 11 sessions         Farheen Chadwick reports:     Subjective   Patient reports she has not been having much pain in her shoulder but it is still stiff.    Objective   See Exercise, Manual, and Modality Logs for complete treatment.       Assessment/Plan  Patient is progressing with ROM and was able to tolerate progressed exercises for UE strengthening and stretching.  She continues to have significant axillary soft tissue restrictions and capsular tightness.    Progress per Plan of Care and Progress strengthening /stabilization /functional activity           Timed:  Manual Therapy:    11     mins  05990;  Therapeutic Exercise:    19     mins  36249 (8 min concurrent);     Neuromuscular Jody:        mins  06550;    Therapeutic Activity:          mins  89744;     Gait Training:           mins  99971;     Ultrasound:          mins  16895;    Iontophoresis:          mins  17707;  Dry Needling:          mins ;    Untimed:  Electrical Stimulation:         mins  02132 ( );  Mechanical Traction:         mins  62328;     Timed Treatment:   30   mins   Total Treatment:     50   mins    Carmelina Mantilla PT  Physical Therapist

## 2020-02-27 ENCOUNTER — TREATMENT (OUTPATIENT)
Dept: PHYSICAL THERAPY | Facility: CLINIC | Age: 30
End: 2020-02-27

## 2020-02-27 DIAGNOSIS — Z98.890 HISTORY OF SHOULDER SURGERY: ICD-10-CM

## 2020-02-27 DIAGNOSIS — M25.311 SHOULDER INSTABILITY, RIGHT: Primary | ICD-10-CM

## 2020-02-27 PROCEDURE — 97110 THERAPEUTIC EXERCISES: CPT | Performed by: PHYSICAL THERAPIST

## 2020-02-27 PROCEDURE — 97140 MANUAL THERAPY 1/> REGIONS: CPT | Performed by: PHYSICAL THERAPIST

## 2020-02-27 NOTE — PROGRESS NOTES
Physical Therapy Daily Progress Note      Patient: Farheen Chadwick   : 1990  Treatment Diagnosis:     ICD-10-CM ICD-9-CM   1. Shoulder instability, right M25.311 718.81   2. History of shoulder surgery Z98.890 V45.89     Referring practitioner: Zan Rios MD  Date of Initial Visit: Type: THERAPY  Noted: 2020  Today's Date: 2020  Patient seen for 12 sessions         Farheen Chadwick reports:     Subjective   Patient reports her shoulder is still tight but she has been able to raise her arm easier.    Objective   See Exercise, Manual, and Modality Logs for complete treatment.       Assessment/Plan  Patient continues to have limites GHJ mobility however she did have improved mobility following manual therapy.  She has muscle guarding and significant axillary soft tissue tightness and TTP.  Progressed exercises and HEP with TRX walkouts.  Progress per Plan of Care           Timed:  Manual Therapy:    24     mins  60004;  Therapeutic Exercise:    16     mins  43042;     Neuromuscular Jody:        mins  10385;    Therapeutic Activity:          mins  63742;     Gait Training:           mins  07708;     Ultrasound:          mins  51175;    Iontophoresis:          mins  66065;  Dry Needling:          mins ;    Untimed:  Electrical Stimulation:         mins  91960 ( );  Mechanical Traction:         mins  21962;     Timed Treatment:   40   mins   Total Treatment:     50   mins    Carmelina Mantilla PT  Physical Therapist

## 2020-03-02 ENCOUNTER — TREATMENT (OUTPATIENT)
Dept: PHYSICAL THERAPY | Facility: CLINIC | Age: 30
End: 2020-03-02

## 2020-03-02 DIAGNOSIS — Z98.890 HISTORY OF SHOULDER SURGERY: ICD-10-CM

## 2020-03-02 DIAGNOSIS — M25.311 SHOULDER INSTABILITY, RIGHT: Primary | ICD-10-CM

## 2020-03-02 PROCEDURE — 97014 ELECTRIC STIMULATION THERAPY: CPT | Performed by: PHYSICAL THERAPIST

## 2020-03-02 PROCEDURE — 97140 MANUAL THERAPY 1/> REGIONS: CPT | Performed by: PHYSICAL THERAPIST

## 2020-03-02 PROCEDURE — 97110 THERAPEUTIC EXERCISES: CPT | Performed by: PHYSICAL THERAPIST

## 2020-03-02 NOTE — PROGRESS NOTES
Physical Therapy Daily Progress Note      Patient: Farheen Chadwick   : 1990  Treatment Diagnosis:     ICD-10-CM ICD-9-CM   1. Shoulder instability, right M25.311 718.81   2. History of shoulder surgery Z98.890 V45.89     Referring practitioner: Zan Rios MD  Date of Initial Visit: Type: THERAPY  Noted: 2020  Today's Date: 3/2/2020  Patient seen for 13 sessions         Farheen Chadwick reports:       Subjective   Patient reports her shoulder has not been as painful but it does feel tighter.  States her  is still helping her stretch but she thinks he is hesitant to apply too much stretch.      Objective   See Exercise, Manual, and Modality Logs for complete treatment.       Assessment/Plan  Patient demonstrates increased soft tissue guarding and tightness this session, especially in the axillar region.  She performed exercises to tolerance and did not have any unusual complaints with manual therapy.  Estim and ice post treatment provided pain relief.  Progress per Plan of Care           Timed:  Manual Therapy:    17     mins  87195;  Therapeutic Exercise:    13     mins  18293 6 min concurrently);     Neuromuscular Jody:        mins  29692;    Therapeutic Activity:          mins  74755;     Gait Training:           mins  46204;     Ultrasound:          mins  34466;    Iontophoresis:          mins  50207;  Dry Needling:          mins ;    Untimed:  Electrical Stimulation:    15     mins  10970 ( );  Mechanical Traction:         mins  35358;     Timed Treatment:   30   mins   Total Treatment:     55   mins    Carmelina Mantilla PT  Physical Therapist

## 2020-03-05 ENCOUNTER — TREATMENT (OUTPATIENT)
Dept: PHYSICAL THERAPY | Facility: CLINIC | Age: 30
End: 2020-03-05

## 2020-03-05 DIAGNOSIS — Z98.890 HISTORY OF SHOULDER SURGERY: ICD-10-CM

## 2020-03-05 DIAGNOSIS — M25.311 SHOULDER INSTABILITY, RIGHT: Primary | ICD-10-CM

## 2020-03-05 PROCEDURE — 97110 THERAPEUTIC EXERCISES: CPT | Performed by: PHYSICAL THERAPIST

## 2020-03-05 PROCEDURE — 97140 MANUAL THERAPY 1/> REGIONS: CPT | Performed by: PHYSICAL THERAPIST

## 2020-03-05 PROCEDURE — 97014 ELECTRIC STIMULATION THERAPY: CPT | Performed by: PHYSICAL THERAPIST

## 2020-03-05 NOTE — PROGRESS NOTES
Physical Therapy Re-Assessment / Re-Certification        Patient: Farheen Chadwick   : 1990  Visit Diagnoses:     ICD-10-CM ICD-9-CM   1. Shoulder instability, right M25.311 718.81   2. History of shoulder surgery Z98.890 V45.89     Referring practitioner: Zan Rios MD  Date of Initial Visit: Type: THERAPY  Noted: 2020  Today's Date: 3/5/2020  Patient seen for 14 sessions      Subjective:   Farheen Chadwick reports:   Subjective Questionnaire: QuickDASH: 25%  Clinical Progress: improved  Home Program Compliance: Yes  Treatment has included: therapeutic exercise, manual therapy, electrical stimulation and cryotherapy    Subjective Evaluation    Pain  At best pain ratin  At worst pain ratin         Patient reports she has been able to put her hair up in a pony tail/bun using both hands.  States is was difficult but she was able to do it by herself.      Objective     Active Range of Motion     Right Shoulder   Flexion: 130 (AAROM) degrees   Extension: 40 degrees   Abduction: 100 (AAROM) degrees   External rotation 45°: 35 (AAROM) degrees   Internal rotation 45°: 25 degrees      Strength/Myotome Testing     Right Shoulder      Planes of Motion   Flexion: 3   Extension: 4-   Abduction: 3-   Adduction: 4   External rotation at 0°: 3-     Assessment/Plan  Patient is progressing slowly with ROM, pain continues to limit her tolerance to stretching.  She has GHJ capsular tightness and is responding positively to manual therapy.  She is progressing towards goals and her DASH score has improved from 84.09% to 25%.  She will require continued aggressive PT to achieve full shoulder ROM and restore function.   Progress toward previous goals: Partially Met    Goal Review  Short Term (2-3 wks):  1. Patient to have increased right shoulder AROM WNLs to restore functional joint mobility.-Progressing  2. Patient will have increased right shoulder strength to 3+/5 to allow for increased joint stability  and to decrease joint/soft tissue stresses.-Progressing  3. Patient will have decreased UQ muscle guarding to allow for improved posture and functional mobility.-Progressing  4. Patient will have increased GHJ mobility to WNLs to allow for restoration of normal joint mechanics and decrease joint/soft tissue stresses.-Progressing     Long Term Goal (4 wks):  1.  Patient will have improved DASH score of 10% or less.-Progressing  2.  Patient will reports decreased shoulder pain to 4/10 at worst to allow for restorative sleep and return to PLOF/Work/Sport/Leisure activities.-Progressing  3.  Patient will be independent in performance of HEP for carryover upon discharge from skilled PT services.-[place]  4.  Patient will have increased right shoulder strength to 5/5 to allow for increased joint stability and to decrease joint/soft tissue stresses.-Progressing      Recommendations: Continue as planned  Timeframe: 1 month  Prognosis to achieve goals: good    PT Signature: Carmelina Mantilla, PT      Based upon review of the patient's progress and continued therapy plan, it is my medical opinion that Farheen Chadwick should continue physical therapy treatment at [facility]  [address] [place] [id number] [phone].    Signature: __________________________________  Zan Rios MD    Timed:  Manual Therapy:    19     mins  37411;  Therapeutic Exercise:    15     mins  80496 (10 minutes concurrently);     Neuromuscular Jody:        mins  34370;    Therapeutic Activity:          mins  54755;     Gait Training:           mins  01011;     Ultrasound:          mins  96794;  Iontophoresis:          mins  53567;    Dry Needling:          mins ;    Untimed:  Electrical Stimulation:    15     mins  33925 ( );  Mechanical Traction:         mins  29753;     Timed Treatment:   34   mins   Total Treatment:     65   mins

## 2020-03-09 ENCOUNTER — TREATMENT (OUTPATIENT)
Dept: PHYSICAL THERAPY | Facility: CLINIC | Age: 30
End: 2020-03-09

## 2020-03-09 DIAGNOSIS — Z98.890 HISTORY OF SHOULDER SURGERY: ICD-10-CM

## 2020-03-09 DIAGNOSIS — M25.311 SHOULDER INSTABILITY, RIGHT: Primary | ICD-10-CM

## 2020-03-09 PROCEDURE — 97014 ELECTRIC STIMULATION THERAPY: CPT | Performed by: PHYSICAL THERAPIST

## 2020-03-09 PROCEDURE — 97140 MANUAL THERAPY 1/> REGIONS: CPT | Performed by: PHYSICAL THERAPIST

## 2020-03-09 PROCEDURE — 97110 THERAPEUTIC EXERCISES: CPT | Performed by: PHYSICAL THERAPIST

## 2020-03-09 NOTE — PROGRESS NOTES
Physical Therapy Daily Progress Note      Patient: Farheen Chadwick   : 1990  Treatment Diagnosis:     ICD-10-CM ICD-9-CM   1. Shoulder instability, right M25.311 718.81   2. History of shoulder surgery Z98.890 V45.89     Referring practitioner: Zan Rios MD  Date of Initial Visit: Type: THERAPY  Noted: 2020  Today's Date: 3/9/2020  Patient seen for 15 sessions         Farheen Chadwick reports:     Subjective   Patient reports she worked a lot of stretching this weekend.  States she bruises really easily and has noticed faint bruising on the front of her shoulder and down her side a little.    Objective   See Exercise, Manual, and Modality Logs for complete treatment.       Assessment/Plan  Patient continues to present with limited ROM and capsular tightness.  She performed exercises to tolerance.  She is responding to manual therapy although is still pain limited and has marked axillary tissue tightness.  Progress per Plan of Care           Timed:  Manual Therapy:    16     mins  56515;  Therapeutic Exercise:    14     mins  81611 (15 min concurrently);     Neuromuscular Jody:        mins  40206;    Therapeutic Activity:          mins  99796;     Gait Training:           mins  04519;     Ultrasound:          mins  70030;    Iontophoresis:          mins  30944;  Dry Needling:          mins ;    Untimed:  Electrical Stimulation:    15     mins  93604 ( );  Mechanical Traction:         mins  31389;     Timed Treatment:  30    mins   Total Treatment:     60   mins    Carmelina Mantilla PT  Physical Therapist

## 2020-08-03 ENCOUNTER — OFFICE VISIT (OUTPATIENT)
Dept: INTERNAL MEDICINE | Facility: CLINIC | Age: 30
End: 2020-08-03

## 2020-08-03 VITALS
WEIGHT: 153 LBS | TEMPERATURE: 98.4 F | HEART RATE: 85 BPM | HEIGHT: 64 IN | OXYGEN SATURATION: 99 % | SYSTOLIC BLOOD PRESSURE: 102 MMHG | BODY MASS INDEX: 26.12 KG/M2 | DIASTOLIC BLOOD PRESSURE: 62 MMHG

## 2020-08-03 DIAGNOSIS — J45.20 MILD INTERMITTENT ASTHMA WITHOUT COMPLICATION: Primary | ICD-10-CM

## 2020-08-03 PROCEDURE — 99213 OFFICE O/P EST LOW 20 MIN: CPT | Performed by: NURSE PRACTITIONER

## 2020-08-03 RX ORDER — ALBUTEROL SULFATE 90 UG/1
2 AEROSOL, METERED RESPIRATORY (INHALATION) EVERY 4 HOURS PRN
Qty: 8 G | Refills: 3 | Status: SHIPPED | OUTPATIENT
Start: 2020-08-03 | End: 2022-11-07 | Stop reason: SDUPTHER

## 2020-08-03 NOTE — PROGRESS NOTES
Subjective   Farheen Chadwick is a 30 y.o. female. Patient is here today for   Chief Complaint   Patient presents with   • Asthma     Pt here as a new patient with Asthma. Pt has noticed recent SOA.    .    History of Present Illness   Answers for HPI/ROS submitted by the patient on 7/27/2020   Shortness of breath  What is the primary reason for your visit?: Shortness of Breath    Patient is here today with concerns about asthma. She is having trouble getting a deep breath. no wheezing, no cough. She had a URI a few weeks ago (tested negative for COVID) and has continued with some shortness of breath. She hasn't had an inhaler in several years.    The following portions of the patient's history were reviewed and updated as appropriate: allergies, current medications, past family history, past medical history, past social history, past surgical history and problem list.    Review of Systems   Constitutional: Negative for fever.   HENT: Negative for ear pain, rhinorrhea and sore throat.    Respiratory: Positive for shortness of breath. Negative for wheezing.    Cardiovascular: Negative for chest pain and leg swelling.   Gastrointestinal: Negative for abdominal pain and vomiting.   Musculoskeletal: Negative for neck pain.   Skin: Negative for rash.   Neurological: Positive for headaches.       Objective   Vitals:    08/03/20 1256   BP: 102/62   Pulse: 85   Temp: 98.4 °F (36.9 °C)   SpO2: 99%     Body mass index is 26.26 kg/m².  Physical Exam   Constitutional: Vital signs are normal. She appears well-developed and well-nourished.   HENT:   Right Ear: Tympanic membrane and ear canal normal.   Left Ear: Tympanic membrane and ear canal normal.   Cardiovascular: Normal rate, regular rhythm and normal heart sounds.   Pulmonary/Chest: Effort normal and breath sounds normal.   Skin: Skin is warm, dry and intact.   Psychiatric: She has a normal mood and affect. Her speech is normal and behavior is normal. Thought content  normal.   Nursing note and vitals reviewed.      Assessment/Plan   Farheen was seen today for asthma.    Diagnoses and all orders for this visit:    Mild intermittent asthma without complication  -     albuterol sulfate  (90 Base) MCG/ACT inhaler; Inhale 2 puffs Every 4 (Four) Hours As Needed for Wheezing.    Patient was given a sample of Asmanex 100 mcg. She is to use it for 2 weeks. She can use albuterol as needed.

## 2021-04-26 ENCOUNTER — OFFICE VISIT (OUTPATIENT)
Dept: ORTHOPEDIC SURGERY | Facility: CLINIC | Age: 31
End: 2021-04-26

## 2021-04-26 VITALS — WEIGHT: 150 LBS | BODY MASS INDEX: 25.61 KG/M2 | TEMPERATURE: 96.7 F | HEIGHT: 64 IN

## 2021-04-26 DIAGNOSIS — M54.2 ACUTE NECK PAIN: Primary | ICD-10-CM

## 2021-04-26 PROCEDURE — 99213 OFFICE O/P EST LOW 20 MIN: CPT | Performed by: NURSE PRACTITIONER

## 2021-04-26 PROCEDURE — 72040 X-RAY EXAM NECK SPINE 2-3 VW: CPT | Performed by: NURSE PRACTITIONER

## 2021-04-26 RX ORDER — METHYLPREDNISOLONE 4 MG/1
TABLET ORAL
Qty: 21 TABLET | Refills: 0 | Status: SHIPPED | OUTPATIENT
Start: 2021-04-26 | End: 2021-11-30

## 2021-04-26 NOTE — PROGRESS NOTES
Patient: Farheen Chadwick    YOB: 1990    Medical Record Number: [id number]    Chief Complaints:  Neck pain    History of Present Illness:     31 y.o. female patient who presents for evaluation of neck pain.  Reports she was at the hair salon 1 week ago and had a deep conditioning treatment to her hair.  While this treatment was processing, her neck was resting on the sink.  Reports later that evening she began experiencing significant pain.  Localizes her pain to the posterior neck.  She has tried Tylenol Liqui-Gels, ibuprofen, and heat without relief.  She gets the most relief a hot shower.  Reports she has had pulled muscles in her neck in the past, which fully resolved with conservative treatments.  She says this pain feels different and does not seem to be improving with despite her conservative efforts.  Patient denies pain radiation.  Denies numbness or tingling in upper extremities.     Allergies:   Allergies   Allergen Reactions   • Latex Anaphylaxis   • Sulfa Antibiotics Unknown (See Comments)     Happened when she was a baby   • Codeine Hallucinations   • Levaquin [Levofloxacin] Swelling       Home Medications:      Current Outpatient Medications:   •  albuterol sulfate  (90 Base) MCG/ACT inhaler, Inhale 2 puffs Every 4 (Four) Hours As Needed for Wheezing., Disp: 8 g, Rfl: 3  •  Loratadine (CLARITIN PO), Take 1 tablet by mouth Daily., Disp: , Rfl:   •  methylPREDNISolone (MEDROL) 4 MG dose pack, Take as directed on package instructions., Disp: 21 tablet, Rfl: 0    Past Medical History:   Diagnosis Date   • Anemia    • Asthma     no inhaler   • Kenrick-Danlos disease    • Heart murmur     benign regurgitation   • Irritable bowel syndrome    • Labral tear of shoulder     RIGHT   • Migraine    • Seasonal allergies        Past Surgical History:   Procedure Laterality Date   •  SECTION N/A 2019    Procedure:  SECTION PRIMARY;  Surgeon: Jessica Jones MD;   Location: [facility] LABOR DELIVERY;  Service: Obstetrics/Gynecology   • EAR TUBES     • KNEE ARTHROSCOPY Right    • SHOULDER ARTHROSCOPY W/ SUPERIOR LABRAL ANTERIOR POSTERIOR REPAIR Right 12/31/2019    Procedure: RIGHT SHOULDER ARTHROSCOPY WITH anterior labral repair;  Surgeon: Zan Rios MD;  Location: [facility] OR [facility];  Service: Orthopedics       Social History     Occupational History   • Not on file   Tobacco Use   • Smoking status: Never Smoker   • Smokeless tobacco: Never Used   Vaping Use   • Vaping Use: Never used   Substance and Sexual Activity   • Alcohol use: No   • Drug use: No   • Sexual activity: Yes     Partners: Male      Social History     Social History Narrative   • Not on file       Family History   Problem Relation Age of Onset   • Hypertension Mother    • Diabetes Mother    • Lung disease Mother    • Heart disease Mother    • Cancer Paternal Uncle    • Clotting disorder Maternal Grandmother    • Cancer Maternal Grandfather    • Hypertension Maternal Grandfather    • Diabetes Paternal Grandmother    • Lung disease Paternal Grandmother    • Heart disease Paternal Grandmother    • Cancer Paternal Grandfather    • Hypertension Paternal Grandfather    • Malig Hyperthermia Neg Hx        Review of Systems:      Constitutional: Denies fever, shaking or chills   Eyes: Denies change in visual acuity   HEENT: Denies nasal congestion or sore throat   Respiratory: Denies cough or shortness of breath   Cardiovascular: Denies chest pain or edema  Endocrine: Denies tremors, palpitations, intolerance of heat or cold, polyuria, polydipsia.  GI: Denies abdominal pain, nausea, vomiting, bloody stools or diarrhea  : Denies frequency, urgency, incontinence, retention, or nocturia.  Musculoskeletal: Denies numbness, tingling or loss of motor function except as above  Integument: Denies rash, lesion or ulceration   Neurologic: Denies headache or focal weakness, deficits  Heme: Denies spontaneous or excessive  "bleeding, epistaxis, hematuria, melena, fatigue, enlarged or tender lymph nodes.      All other pertinent positives and negatives as noted above in HPI.    Physical Exam: 31 y.o. female    Vitals:    04/26/21 1627   Temp: 96.7 °F (35.9 °C)   Weight: 68 kg (150 lb)   Height: 162.6 cm (64\")       General:  Patient is awake and alert.  Appears in no acute distress or discomfort.    Psych:  Affect and demeanor are appropriate.    Eyes:  Conjunctiva and sclera appear grossly normal.  Eyes track well and EOM seem to be intact.    Ears:  No gross abnormalities.  Hearing adequate for the exam.    Cardiovascular:  Regular rate and rhythm.    Lungs:  Good chest expansion.  Breathing unlabored.    Lymph:  No palpable masses or adenopathy in the affected extremity    Extremities:  Cervical spine:  No gross abnormalities on inspection.  Skin appears benign.  No palpable step-offs, masses or adenopathy.  Good neck motion.  Negative Spurling's bilaterally.         Radiology:   Dr. Rios and I reviewed the x-rays together.  AP and lateral views of the cervical spine are ordered by myself and reviewed to evaluate the patient's complaint.  No comparison films are immediately available.  The x-rays show a significant loss of cervical lordosis.  Otherwise, no obvious acute abnormalities, lesions, masses, significant degenerative changes, or other concerning findings.    Assessment/Plan:  Acute neck pain without radiculopathy    We discussed conservative treatments including ice, rest, anti-inflammatories, topical pain creams, and oral steroids.  She acknowledged understanding of all we discussed and is opted for oral steroids.  I have given her a prescription for Medrol Dosepak.  Additionally, I have given her a prescription for a transdermal pain/anti-inflammatory pain cream through [facility] pharmacy.  The risks of each medication discussed.  If her symptoms persist, I recommend she consider a referral to Dr. Lombardi for " further evaluation.  Going forward, she will follow-up as needed.     GEORGIA Byrd    04/26/2021    CC to Katarzyna Byrd APRN

## 2022-05-31 ENCOUNTER — TELEPHONE (OUTPATIENT)
Dept: ORTHOPEDIC SURGERY | Facility: CLINIC | Age: 32
End: 2022-05-31

## 2022-05-31 NOTE — TELEPHONE ENCOUNTER
Provider: DR. HOLLIDAY    Caller: PATIENT    Relationship to Patient: SELF    Phone Number:  [phone]    Reason for Call: ESTABLISHED PT. OF DR. HOLLIDAY- HAS SEEN FOR SHOULDER.   SHE IS HAVING PAIN, STIFFNESS, AND FEELS LIKE TO JOINT IS DISLOCATING IN HER RIGHT HAND/FINGERS- MOSTLY MIDDLE FINGER OF RIGHT HAND- KNUCKLE JOINT.   PT. NOT SURE IF IT MAY BE RELATED TO TRIGGER FINGER.   PER HUB REF. TOOL- DR. HOLLIDAY WILL SEE SOME HAND/FINGER PROBLEMS.   PT. ASKING IF DR. HOLLIDAY CAN SEE HER FOR THIS?, OR IF NOT, CAN HE REFER HER TO A HAND SPECIALIST?  PLEASE CALL TO ADVISE.     When was the patient last seen: 04/2021

## 2022-06-06 ENCOUNTER — OFFICE VISIT (OUTPATIENT)
Dept: ORTHOPEDIC SURGERY | Facility: CLINIC | Age: 32
End: 2022-06-06

## 2022-06-06 VITALS — HEIGHT: 64 IN | TEMPERATURE: 97.8 F | WEIGHT: 150 LBS | BODY MASS INDEX: 25.61 KG/M2

## 2022-06-06 DIAGNOSIS — M79.641 RIGHT HAND PAIN: Primary | ICD-10-CM

## 2022-06-06 PROCEDURE — 73130 X-RAY EXAM OF HAND: CPT | Performed by: NURSE PRACTITIONER

## 2022-06-06 PROCEDURE — 99213 OFFICE O/P EST LOW 20 MIN: CPT | Performed by: NURSE PRACTITIONER

## 2022-06-06 RX ORDER — MELOXICAM 15 MG/1
15 TABLET ORAL DAILY
Qty: 30 TABLET | Refills: 1 | Status: SHIPPED | OUTPATIENT
Start: 2022-06-06 | End: 2022-11-07

## 2022-06-06 NOTE — PROGRESS NOTES
Patient: aFrheen Chadwick    YOB: 1990    Medical Record Number: [id number]    Chief Complaints:  Right hand pain    History of Present Illness:     32 y.o. female patient who presents with recent history of right hand pain.  Localizes the majority of her pain to the long finger.  Patient is unable to recall any specific inciting event or factor.  Reports pain is sharp, intermittent, and moderate in severity.  The pain is associated with stiffness and a mechanical snapping.  Reports associated weakness with  as well.  Symptoms temporarily alleviated by rest, Tylenol, and Advil.  Symptoms aggravated by gripping and sewing.  Localizes pain to the base of the finger which radiates to the tip of her finger.  She has noticed similar symptoms intermittently in other fingers as well.  Denies increased hand pain in the mornings.  Reports she say a rheumatologist approximately 3 years ago, but reportedly was negative for rheumatoid arthritis.  Denies any paresthesias or numbness.  No other associated symptoms.  Patient is right-hand dominant.    Allergies:   Allergies   Allergen Reactions   • Latex Anaphylaxis   • Sulfa Antibiotics Unknown (See Comments)     Happened when she was a baby   • Codeine Hallucinations   • Levaquin [Levofloxacin] Swelling       Home Medications:      Current Outpatient Medications:   •  albuterol sulfate  (90 Base) MCG/ACT inhaler, Inhale 2 puffs Every 4 (Four) Hours As Needed for Wheezing., Disp: 8 g, Rfl: 3  •  fluticasone (FLONASE) 50 MCG/ACT nasal spray, 2 sprays into the nostril(s) as directed by provider Daily., Disp: 11.1 mL, Rfl: 0  •  Loratadine (CLARITIN PO), Take 1 tablet by mouth Daily., Disp: , Rfl:   •  pseudoephedrine (Sudafed) 30 MG tablet, Take 2 tablets by mouth 3 (Three) Times a Day., Disp: 30 tablet, Rfl: 0  •  meloxicam (Mobic) 15 MG tablet, Take 1 tablet by mouth Daily., Disp: 30 tablet, Rfl: 1    Past Medical History:   Diagnosis Date   •  Anemia    • Asthma     no inhaler   • Kenrick-Danlos disease    • Heart murmur     benign regurgitation   • Irritable bowel syndrome    • Labral tear of shoulder     RIGHT   • Migraine    • Seasonal allergies        Past Surgical History:   Procedure Laterality Date   •  SECTION N/A 2019    Procedure:  SECTION PRIMARY;  Surgeon: Jessica Jones MD;  Location: [facility] LABOR DELIVERY;  Service: Obstetrics/Gynecology   • EAR TUBES     • KNEE ARTHROSCOPY Right    • SHOULDER ARTHROSCOPY W/ SUPERIOR LABRAL ANTERIOR POSTERIOR REPAIR Right 2019    Procedure: RIGHT SHOULDER ARTHROSCOPY WITH anterior labral repair;  Surgeon: Zan Rios MD;  [place] [facility] [place] [facility] [place]  Service: [place]opedics       Social History     Occupational History   • Not on file   Tobacco Use   • Smoking status: Never Smoker   • Smokeless tobacco: Never Used   Vaping Use   • Vaping Use: Never used   Substance and Sexual Activity   • Alcohol use: No   • Drug use: No   • Sexual activity: Yes     Partners: Male      Social History     Social History Narrative   • Not on file       Family History   Problem Relation Age of Onset   • Hypertension Mother    • Diabetes Mother    • Lung disease Mother    • Heart disease Mother    • Cancer Paternal Uncle    • Clotting disorder Maternal Grandmother    • Cancer Maternal Grandfather    • Hypertension Maternal Grandfather    • Diabetes Paternal Grandmother    • Lung disease Paternal Grandmother    • Heart disease Paternal Grandmother    • Cancer Paternal Grandfather    • Hypertension Paternal Grandfather    • Malig Hyperthermia Neg Hx        Review of Systems:      Constitutional: Denies fever, shaking or chills   Eyes: Denies change in visual acuity   HEENT: Denies nasal congestion or sore throat   Respiratory: Denies cough or shortness of breath   Cardiovascular: Denies chest pain or edema  Endocrine: Denies tremors, palpitations, intolerance of heat or cold, polyuria, polydipsia.  GI:  "Denies abdominal pain, nausea, vomiting, bloody stools or diarrhea  : Denies frequency, urgency, incontinence, retention, or nocturia.  Musculoskeletal: Denies numbness tingling or loss of motor function except as above  Integument: Denies rash, lesion or ulceration   Neurologic: Denies headache or focal weakness, deficits  Heme: Denies epistaxis, spontaneous or excessive bleeding, epistaxis, hematuria, melena, fatigue, enlarged or tender lymph nodes.      All other pertinent positives and negatives as noted above in HPI.    Physical Exam: 32 y.o. female  Vitals:    06/06/22 0859   Temp: 97.8 °F (36.6 °C)   Weight: 68 kg (150 lb)   Height: 162.6 cm (64.02\")       General:  Patient is awake and alert.  Appears in no acute distress or discomfort.    Psych:  Affect and demeanor are appropriate.    Eyes:  Conjunctiva and sclera appear grossly normal.  Eyes track well and EOM seem to be intact.    Ears:  No gross abnormalities.  Hearing adequate for the exam.    Dentition:  No gross abnormalities noted.    Cardiovascular:  Regular rate and rhythm.    Lungs:  Good chest expansion.  Breathing unlabored.    Lymph:  No palpable masses or adenopathy in right upper extremity.    Right upper extremity:   Skin is benign.  No obvious swellings, masses or atrophy.  No palpable masses or adenopathy.  Focal tenderness at the A1 pulley at the base of the long finger.  Unable to reproduce triggering of the finger, but the exam was very uncomfortable for her.  Full elbow, wrist range of motion.  No instability.  Negative Tinel's, Phalen's over carpal tunnel.  Unable to fully assess  strength due to discomfort.  Good pinch, finger and thumb abduction strength.  Intact sensation.  Good radial pulse.  Brisk cap refill         Radiology:   AP, oblique, and lateral views of the right hand are ordered by myself and reviewed to evaluate the patient's complaint.  No comparison films are immediately available.  The x-rays show no obvious " acute abnormalities, lesions, masses, significant degenerative changes, or other concerning findings.    Assessment:  Right hand pain, suspect stenosing tenosynovitis of the long finger    Plan:  We discussed all available treatments for this condition in detail, both surgical and non-surgical.  With regards to conservative treatment, we discussed anti-inflammatories, injections, and transdermal pain/anti-inflammatory creams.  I recommended she consider a cortisone injection, but she declined.  She may consider this as a future option.  She agreed to a prescription for meloxicam.  The risks were discussed.  Going forward, I am happy to see her back if her symptoms persist or worsen.      GEORGIA Byrd    06/06/2022    CC to Katarzyna Byrd APRN

## 2022-11-07 ENCOUNTER — OFFICE VISIT (OUTPATIENT)
Dept: INTERNAL MEDICINE | Facility: CLINIC | Age: 32
End: 2022-11-07

## 2022-11-07 VITALS
WEIGHT: 144 LBS | BODY MASS INDEX: 24.59 KG/M2 | HEIGHT: 64 IN | DIASTOLIC BLOOD PRESSURE: 68 MMHG | OXYGEN SATURATION: 99 % | SYSTOLIC BLOOD PRESSURE: 112 MMHG | HEART RATE: 77 BPM

## 2022-11-07 DIAGNOSIS — G43.109 MIGRAINE WITH AURA AND WITHOUT STATUS MIGRAINOSUS, NOT INTRACTABLE: Primary | ICD-10-CM

## 2022-11-07 DIAGNOSIS — Z00.00 HEALTH CARE MAINTENANCE: ICD-10-CM

## 2022-11-07 DIAGNOSIS — J45.20 MILD INTERMITTENT ASTHMA WITHOUT COMPLICATION: ICD-10-CM

## 2022-11-07 PROCEDURE — 99213 OFFICE O/P EST LOW 20 MIN: CPT | Performed by: NURSE PRACTITIONER

## 2022-11-07 PROCEDURE — 90471 IMMUNIZATION ADMIN: CPT | Performed by: NURSE PRACTITIONER

## 2022-11-07 PROCEDURE — 90686 IIV4 VACC NO PRSV 0.5 ML IM: CPT | Performed by: NURSE PRACTITIONER

## 2022-11-07 RX ORDER — SUMATRIPTAN 50 MG/1
TABLET, FILM COATED ORAL
Qty: 9 TABLET | Refills: 6 | Status: SHIPPED | OUTPATIENT
Start: 2022-11-07 | End: 2023-02-07 | Stop reason: SINTOL

## 2022-11-07 RX ORDER — ALBUTEROL SULFATE 90 UG/1
2 AEROSOL, METERED RESPIRATORY (INHALATION) EVERY 4 HOURS PRN
Qty: 8 G | Refills: 3 | Status: SHIPPED | OUTPATIENT
Start: 2022-11-07

## 2022-11-07 NOTE — PROGRESS NOTES
Subjective  Answers for HPI/ROS submitted by the patient on 11/6/2022  Please describe your symptoms.: Very frequent migraines  Have you had these symptoms before?: Yes  How long have you been having these symptoms?: Greater than 2 weeks  Please list any medications you are currently taking for this condition.: Excedrin, advil  What is the primary reason for your visit?: Melodie Chadwick is a 32 y.o. female. Patient is here today for   Chief Complaint   Patient presents with   • Migraine     Pt has history of having migraines. They have worsened since giving birth to her child x3 years ago.     • Asthma     Pt needing refill on albuterol inhaler.    .    History of Present Illness   C/o increased migraines. She had a prescription for medication as a teenager, but her migraines improved over time. About 3 years ago, after the birth of her child, her migraines returned associated with nausea, light sensitivity. Excedrin takes the edge off. She has also tried Ibuprofen, water, caffeine.   Pain is located at the back of head, sometimes top of head, behind eyes. She is having up to 3 migraines a week    Patient is requesting a refill on her albuterol inhaler.  Denies any concerns    The following portions of the patient's history were reviewed and updated as appropriate: allergies, current medications, past family history, past medical history, past social history, past surgical history and problem list.    Review of Systems    Objective   Vitals:    11/07/22 0933   BP: 112/68   Pulse: 77   SpO2: 99%     Body mass index is 24.7 kg/m².  Physical Exam  Vitals and nursing note reviewed.   Constitutional:       Appearance: Normal appearance. She is well-developed.   HENT:      Right Ear: Tympanic membrane and ear canal normal.      Left Ear: Tympanic membrane and ear canal normal.   Cardiovascular:      Rate and Rhythm: Normal rate and regular rhythm.      Heart sounds: Normal heart sounds.   Pulmonary:       Effort: Pulmonary effort is normal.      Breath sounds: Normal breath sounds.   Skin:     General: Skin is warm and dry.   Neurological:      Mental Status: She is alert and oriented to person, place, and time.   Psychiatric:         Speech: Speech normal.         Behavior: Behavior normal.         Thought Content: Thought content normal.         Assessment & Plan   Diagnoses and all orders for this visit:    1. Migraine with aura and without status migrainosus, not intractable (Primary)  -     SUMAtriptan (Imitrex) 50 MG tablet; Take one tablet at onset of headache. May repeat dose one time in 2 hours if headache not relieved.  Dispense: 9 tablet; Refill: 6    2. Mild intermittent asthma without complication  -     albuterol sulfate  (90 Base) MCG/ACT inhaler; Inhale 2 puffs Every 4 (Four) Hours As Needed for Wheezing.  Dispense: 8 g; Refill: 3    3. Health care maintenance  -     FluLaval/Fluzone >6 mos (9891-1032)      Migraines -we will send in a prescription for Imitrex.  Follow-up in 3 months for a physical and a recheck on migraines.  Patient may need preventative medication    Asthma - refill of albuterol sent to her pharmacy

## 2023-02-07 ENCOUNTER — OFFICE VISIT (OUTPATIENT)
Dept: INTERNAL MEDICINE | Facility: CLINIC | Age: 33
End: 2023-02-07
Payer: COMMERCIAL

## 2023-02-07 VITALS
HEART RATE: 74 BPM | SYSTOLIC BLOOD PRESSURE: 128 MMHG | BODY MASS INDEX: 24.59 KG/M2 | OXYGEN SATURATION: 98 % | DIASTOLIC BLOOD PRESSURE: 80 MMHG | WEIGHT: 144 LBS | TEMPERATURE: 98.1 F | HEIGHT: 64 IN

## 2023-02-07 DIAGNOSIS — G43.109 MIGRAINE WITH AURA AND WITHOUT STATUS MIGRAINOSUS, NOT INTRACTABLE: ICD-10-CM

## 2023-02-07 DIAGNOSIS — Z00.00 HEALTHCARE MAINTENANCE: Primary | ICD-10-CM

## 2023-02-07 PROCEDURE — 99395 PREV VISIT EST AGE 18-39: CPT | Performed by: NURSE PRACTITIONER

## 2023-02-07 PROCEDURE — 99213 OFFICE O/P EST LOW 20 MIN: CPT | Performed by: NURSE PRACTITIONER

## 2023-02-07 RX ORDER — RIMEGEPANT SULFATE 75 MG/75MG
75 TABLET, ORALLY DISINTEGRATING ORAL EVERY OTHER DAY
Qty: 16 TABLET | Refills: 3 | Status: SHIPPED | OUTPATIENT
Start: 2023-02-07

## 2023-02-07 NOTE — PROGRESS NOTES
"Subjective   Farheen Chadwick is a 33 y.o. female and is here for a comprehensive physical exam. The patient reports : She is here to follow up on migraines. She states that imitrex caused nausea and stomach pain, makes her drowsy. It did help some, but she doesn't like to take it due to the side effects.   She is having migraines 3 times a week and they are worse around her menstrual cycle.   Cold wrap around her head helps.    Do you take any herbs or supplements that were not prescribed by a doctor? no     History:  Sees [facility]    The following portions of the patient's history were reviewed and updated as appropriate: allergies, current medications, past family history, past medical history, past social history, past surgical history and problem list.    Review of Systems  Do you have pain that bothers you in your daily life? no  Pertinent items are noted in HPI.    Objective   /80   Pulse 74   Temp 98.1 °F (36.7 °C)   Ht 162.6 cm (64.02\")   Wt 65.3 kg (144 lb)   SpO2 98%   BMI 24.71 kg/m²     General Appearance:    Alert, cooperative, no distress, appears stated age   Head:    Normocephalic, without obvious abnormality, atraumatic   Eyes:    PERRL, conjunctiva/corneas clear, EOM's intact, both eyes   Ears:    Normal TM's and external ear canals, both ears   Nose:   Nares normal, septum midline, mucosa normal, no drainage    or sinus tenderness   Throat:   Lips, mucosa, and tongue normal; teeth and gums normal   Neck:   Supple, symmetrical, trachea midline, no adenopathy;     thyroid:  no enlargement/tenderness/nodules; no carotid    bruit   Back:     Symmetric, no curvature, ROM normal, no CVA tenderness   Lungs:     Clear to auscultation bilaterally, respirations unlabored   Chest Wall:    No tenderness or deformity    Heart:    Regular rate and rhythm, S1 and S2 normal, no murmur       Abdomen:     Soft, non-tender, bowel sounds active all four quadrants,     no masses, no organomegaly; " rectus abdominis diastasis present            Extremities:   Extremities normal, atraumatic, no cyanosis or edema   Pulses:   2+ and symmetric all extremities   Skin:   Skin color, texture, turgor normal, no rashes or lesions   Lymph nodes:   Cervical, supraclavicular, and axillary nodes normal   Neurologic:   Grossly intact, normal strength, sensation and reflexes     throughout        Assessment & Plan   Healthy female exam.      1. Diagnoses and all orders for this visit:    1. Healthcare maintenance (Primary)  -     Vitamin D,25-Hydroxy  -     CBC (No Diff)    2. Migraine with aura and without status migrainosus, not intractable  -     Rimegepant Sulfate (Nurtec) 75 MG tablet dispersible tablet; Take 1 tablet by mouth Every Other Day.  Dispense: 16 tablet; Refill: 3  -     Vitamin D,25-Hydroxy  -     Vitamin B12  -     Iron and TIBC  -     Ferritin  -     CBC (No Diff)    migraines - will discontinue imitrex and try nurtec.  Patient will take it every other day as a preventative.  She was given a sample and coupon card.    We will check the above labs and call patient with results.  Patient states that she has had an iron deficiency in the past    2. Patient Counseling:  --Nutrition: Stressed importance of moderation in sodium/caffeine intake, saturated fat and cholesterol, caloric balance, sufficient intake of fresh fruits, vegetables, fiber, calcium, iron.  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  --Immunizations reviewed.    3. Discussed the patient's BMI with her.  The BMI is in the acceptable range  4. Follow up one month.  Will send a [employer] message.  If she is doing well with Nurtec, will continue with that.  If she continues with frequent headaches, will refer to neurology.

## 2023-02-08 DIAGNOSIS — E55.9 VITAMIN D DEFICIENCY: Primary | ICD-10-CM

## 2023-02-08 DIAGNOSIS — E53.8 VITAMIN B12 DEFICIENCY: ICD-10-CM

## 2023-02-08 LAB
25(OH)D3+25(OH)D2 SERPL-MCNC: 25.1 NG/ML (ref 30–100)
ERYTHROCYTE [DISTWIDTH] IN BLOOD BY AUTOMATED COUNT: 12.4 % (ref 12.3–15.4)
FERRITIN SERPL-MCNC: 40.2 NG/ML (ref 13–150)
HCT VFR BLD AUTO: 39.3 % (ref 34–46.6)
HGB BLD-MCNC: 13.1 G/DL (ref 12–15.9)
IRON SATN MFR SERPL: 25 % (ref 20–50)
IRON SERPL-MCNC: 114 MCG/DL (ref 37–145)
MCH RBC QN AUTO: 29.8 PG (ref 26.6–33)
MCHC RBC AUTO-ENTMCNC: 33.3 G/DL (ref 31.5–35.7)
MCV RBC AUTO: 89.3 FL (ref 79–97)
PLATELET # BLD AUTO: 339 10*3/MM3 (ref 140–450)
RBC # BLD AUTO: 4.4 10*6/MM3 (ref 3.77–5.28)
TIBC SERPL-MCNC: 451 MCG/DL
UIBC SERPL-MCNC: 337 MCG/DL (ref 112–346)
VIT B12 SERPL-MCNC: 287 PG/ML (ref 211–946)
WBC # BLD AUTO: 5.23 10*3/MM3 (ref 3.4–10.8)

## 2023-02-09 ENCOUNTER — TELEPHONE (OUTPATIENT)
Dept: INTERNAL MEDICINE | Facility: CLINIC | Age: 33
End: 2023-02-09
Payer: COMMERCIAL

## 2023-02-09 NOTE — TELEPHONE ENCOUNTER
Queenie for hub to read :    lvm for patient to call back regarding lab results.   Please war, transfer

## 2023-05-29 DIAGNOSIS — G43.109 MIGRAINE WITH AURA AND WITHOUT STATUS MIGRAINOSUS, NOT INTRACTABLE: ICD-10-CM

## 2023-05-30 RX ORDER — RIMEGEPANT SULFATE 75 MG/75MG
75 TABLET, ORALLY DISINTEGRATING ORAL EVERY OTHER DAY
Qty: 16 TABLET | Refills: 3 | Status: SHIPPED | OUTPATIENT
Start: 2023-05-30

## 2023-10-27 DIAGNOSIS — G43.109 MIGRAINE WITH AURA AND WITHOUT STATUS MIGRAINOSUS, NOT INTRACTABLE: ICD-10-CM

## 2023-10-27 RX ORDER — RIMEGEPANT SULFATE 75 MG/75MG
TABLET, ORALLY DISINTEGRATING ORAL
Qty: 16 TABLET | Refills: 2 | Status: SHIPPED | OUTPATIENT
Start: 2023-10-27

## 2023-11-21 ENCOUNTER — APPOINTMENT (OUTPATIENT)
Dept: CT IMAGING | Facility: HOSPITAL | Age: 33
End: 2023-11-21
Payer: COMMERCIAL

## 2023-11-21 ENCOUNTER — HOSPITAL ENCOUNTER (EMERGENCY)
Facility: HOSPITAL | Age: 33
Discharge: HOME | End: 2023-11-21
Attending: EMERGENCY MEDICINE | Admitting: EMERGENCY MEDICINE
Payer: COMMERCIAL

## 2023-11-21 VITALS
OXYGEN SATURATION: 97 % | RESPIRATION RATE: 16 BRPM | SYSTOLIC BLOOD PRESSURE: 98 MMHG | HEART RATE: 67 BPM | DIASTOLIC BLOOD PRESSURE: 78 MMHG | HEIGHT: 64 IN | TEMPERATURE: 97.9 F | BODY MASS INDEX: 23.05 KG/M2 | WEIGHT: 135 LBS

## 2023-11-21 DIAGNOSIS — R10.31 RIGHT GROIN PAIN: Primary | ICD-10-CM

## 2023-11-21 DIAGNOSIS — L03.314 CELLULITIS OF GROIN, RIGHT: ICD-10-CM

## 2023-11-21 LAB
ALBUMIN SERPL-MCNC: 4.4 G/DL (ref 3.5–5.2)
ALBUMIN/GLOB SERPL: 1.9 G/DL
ALP SERPL-CCNC: 43 U/L (ref 39–117)
ALT SERPL W P-5'-P-CCNC: 13 U/L (ref 1–33)
ANION GAP SERPL CALCULATED.3IONS-SCNC: 8.4 MMOL/L (ref 5–15)
AST SERPL-CCNC: 16 U/L (ref 1–32)
BACTERIA UR QL AUTO: ABNORMAL /HPF
BASOPHILS # BLD AUTO: 0.08 10*3/MM3 (ref 0–0.2)
BASOPHILS NFR BLD AUTO: 1.1 % (ref 0–1.5)
BILIRUB SERPL-MCNC: 0.2 MG/DL (ref 0–1.2)
BILIRUB UR QL STRIP: NEGATIVE
BUN SERPL-MCNC: 10 MG/DL (ref 6–20)
BUN/CREAT SERPL: 16.7 (ref 7–25)
CALCIUM SPEC-SCNC: 9.2 MG/DL (ref 8.6–10.5)
CHLORIDE SERPL-SCNC: 100 MMOL/L (ref 98–107)
CLARITY UR: CLEAR
CO2 SERPL-SCNC: 24.6 MMOL/L (ref 22–29)
COLOR UR: YELLOW
CREAT SERPL-MCNC: 0.6 MG/DL (ref 0.57–1)
DEPRECATED RDW RBC AUTO: 41.5 FL (ref 37–54)
EGFRCR SERPLBLD CKD-EPI 2021: 121 ML/MIN/1.73
EOSINOPHIL # BLD AUTO: 0.28 10*3/MM3 (ref 0–0.4)
EOSINOPHIL NFR BLD AUTO: 3.8 % (ref 0.3–6.2)
ERYTHROCYTE [DISTWIDTH] IN BLOOD BY AUTOMATED COUNT: 12.3 % (ref 12.3–15.4)
GLOBULIN UR ELPH-MCNC: 2.3 GM/DL
GLUCOSE SERPL-MCNC: 157 MG/DL (ref 65–99)
GLUCOSE UR STRIP-MCNC: NEGATIVE MG/DL
HCG SERPL QL: NEGATIVE
HCT VFR BLD AUTO: 35.7 % (ref 34–46.6)
HGB BLD-MCNC: 11.7 G/DL (ref 12–15.9)
HGB UR QL STRIP.AUTO: ABNORMAL
HYALINE CASTS UR QL AUTO: ABNORMAL /LPF
IMM GRANULOCYTES # BLD AUTO: 0.01 10*3/MM3 (ref 0–0.05)
IMM GRANULOCYTES NFR BLD AUTO: 0.1 % (ref 0–0.5)
KETONES UR QL STRIP: NEGATIVE
LEUKOCYTE ESTERASE UR QL STRIP.AUTO: NEGATIVE
LYMPHOCYTES # BLD AUTO: 2.97 10*3/MM3 (ref 0.7–3.1)
LYMPHOCYTES NFR BLD AUTO: 40.2 % (ref 19.6–45.3)
MCH RBC QN AUTO: 30.2 PG (ref 26.6–33)
MCHC RBC AUTO-ENTMCNC: 32.8 G/DL (ref 31.5–35.7)
MCV RBC AUTO: 92.2 FL (ref 79–97)
MONOCYTES # BLD AUTO: 0.6 10*3/MM3 (ref 0.1–0.9)
MONOCYTES NFR BLD AUTO: 8.1 % (ref 5–12)
NEUTROPHILS NFR BLD AUTO: 3.45 10*3/MM3 (ref 1.7–7)
NEUTROPHILS NFR BLD AUTO: 46.7 % (ref 42.7–76)
NITRITE UR QL STRIP: NEGATIVE
NRBC BLD AUTO-RTO: 0 /100 WBC (ref 0–0.2)
PH UR STRIP.AUTO: 6 [PH] (ref 5–8)
PLATELET # BLD AUTO: 292 10*3/MM3 (ref 140–450)
PMV BLD AUTO: 10.4 FL (ref 6–12)
POTASSIUM SERPL-SCNC: 3.5 MMOL/L (ref 3.5–5.2)
PROT SERPL-MCNC: 6.7 G/DL (ref 6–8.5)
PROT UR QL STRIP: NEGATIVE
RBC # BLD AUTO: 3.87 10*6/MM3 (ref 3.77–5.28)
RBC # UR STRIP: ABNORMAL /HPF
REF LAB TEST METHOD: ABNORMAL
SODIUM SERPL-SCNC: 133 MMOL/L (ref 136–145)
SP GR UR STRIP: >=1.03 (ref 1–1.03)
SQUAMOUS #/AREA URNS HPF: ABNORMAL /HPF
UROBILINOGEN UR QL STRIP: ABNORMAL
WBC # UR STRIP: ABNORMAL /HPF
WBC NRBC COR # BLD AUTO: 7.39 10*3/MM3 (ref 3.4–10.8)

## 2023-11-21 PROCEDURE — 96365 THER/PROPH/DIAG IV INF INIT: CPT

## 2023-11-21 PROCEDURE — 74177 CT ABD & PELVIS W/CONTRAST: CPT

## 2023-11-21 PROCEDURE — 25010000002 AMPICILLIN-SULBACTAM PER 1.5 G: Performed by: EMERGENCY MEDICINE

## 2023-11-21 PROCEDURE — 36415 COLL VENOUS BLD VENIPUNCTURE: CPT

## 2023-11-21 PROCEDURE — 85025 COMPLETE CBC W/AUTO DIFF WBC: CPT | Performed by: EMERGENCY MEDICINE

## 2023-11-21 PROCEDURE — 81001 URINALYSIS AUTO W/SCOPE: CPT | Performed by: EMERGENCY MEDICINE

## 2023-11-21 PROCEDURE — 25010000002 ONDANSETRON PER 1 MG: Performed by: EMERGENCY MEDICINE

## 2023-11-21 PROCEDURE — 96376 TX/PRO/DX INJ SAME DRUG ADON: CPT

## 2023-11-21 PROCEDURE — 99285 EMERGENCY DEPT VISIT HI MDM: CPT

## 2023-11-21 PROCEDURE — 84703 CHORIONIC GONADOTROPIN ASSAY: CPT | Performed by: EMERGENCY MEDICINE

## 2023-11-21 PROCEDURE — 25010000002 MORPHINE PER 10 MG: Performed by: EMERGENCY MEDICINE

## 2023-11-21 PROCEDURE — 80053 COMPREHEN METABOLIC PANEL: CPT | Performed by: EMERGENCY MEDICINE

## 2023-11-21 PROCEDURE — 25510000001 IOPAMIDOL 61 % SOLUTION: Performed by: EMERGENCY MEDICINE

## 2023-11-21 PROCEDURE — 96375 TX/PRO/DX INJ NEW DRUG ADDON: CPT

## 2023-11-21 RX ORDER — ONDANSETRON 8 MG/1
8 TABLET, ORALLY DISINTEGRATING ORAL EVERY 8 HOURS PRN
Qty: 12 TABLET | Refills: 0 | Status: SHIPPED | OUTPATIENT
Start: 2023-11-21

## 2023-11-21 RX ORDER — SODIUM CHLORIDE 0.9 % (FLUSH) 0.9 %
10 SYRINGE (ML) INJECTION AS NEEDED
Status: DISCONTINUED | OUTPATIENT
Start: 2023-11-21 | End: 2023-11-21 | Stop reason: HOSPADM

## 2023-11-21 RX ORDER — HYDROCODONE BITARTRATE AND ACETAMINOPHEN 5; 325 MG/1; MG/1
1 TABLET ORAL EVERY 4 HOURS PRN
Qty: 16 TABLET | Refills: 0 | Status: SHIPPED | OUTPATIENT
Start: 2023-11-21

## 2023-11-21 RX ORDER — AMOXICILLIN AND CLAVULANATE POTASSIUM 875; 125 MG/1; MG/1
1 TABLET, FILM COATED ORAL 2 TIMES DAILY
Qty: 14 TABLET | Refills: 0 | Status: SHIPPED | OUTPATIENT
Start: 2023-11-21

## 2023-11-21 RX ORDER — ONDANSETRON 2 MG/ML
4 INJECTION INTRAMUSCULAR; INTRAVENOUS ONCE
Status: COMPLETED | OUTPATIENT
Start: 2023-11-21 | End: 2023-11-21

## 2023-11-21 RX ORDER — MORPHINE SULFATE 2 MG/ML
4 INJECTION, SOLUTION INTRAMUSCULAR; INTRAVENOUS ONCE
Status: COMPLETED | OUTPATIENT
Start: 2023-11-21 | End: 2023-11-21

## 2023-11-21 RX ORDER — FLUCONAZOLE 150 MG/1
150 TABLET ORAL ONCE
Qty: 1 TABLET | Refills: 0 | Status: SHIPPED | OUTPATIENT
Start: 2023-11-21 | End: 2023-11-21

## 2023-11-21 RX ADMIN — ONDANSETRON 4 MG: 2 INJECTION INTRAMUSCULAR; INTRAVENOUS at 03:55

## 2023-11-21 RX ADMIN — MORPHINE SULFATE 4 MG: 2 INJECTION, SOLUTION INTRAMUSCULAR; INTRAVENOUS at 03:55

## 2023-11-21 RX ADMIN — MORPHINE SULFATE 4 MG: 2 INJECTION, SOLUTION INTRAMUSCULAR; INTRAVENOUS at 01:14

## 2023-11-21 RX ADMIN — ONDANSETRON 4 MG: 2 INJECTION INTRAMUSCULAR; INTRAVENOUS at 01:14

## 2023-11-21 RX ADMIN — IOPAMIDOL 85 ML: 612 INJECTION, SOLUTION INTRAVENOUS at 02:03

## 2023-11-21 RX ADMIN — AMPICILLIN SODIUM AND SULBACTAM SODIUM 3 G: 2; 1 INJECTION, POWDER, FOR SOLUTION INTRAMUSCULAR; INTRAVENOUS at 04:01

## 2023-11-21 NOTE — ED TRIAGE NOTES
Pt arrive ambulatory from home after reports began onset x3 days pta with right lower abdominal and pelvic pain with mass to right groin/pelvic area and states today pain has gotten increasingly worse with tenderness on palpation to area.  Reports some nausea .  Denies diarrhea/constipation and vomiting.

## 2023-11-21 NOTE — ED PROVIDER NOTES
EMERGENCY DEPARTMENT ENCOUNTER    Room Number:    PCP: Katarzyna Byrd APRN  Historian: Patient      HPI:  Chief Complaint: Pelvic pain  A complete HPI/ROS/PMH/PSH/SH/FH are unobtainable due to: None  Context: Farheen Chadwick is a 33 y.o. female who presents to the ED c/o fairly sudden onset of right-sided groin/pelvic pain with associated swelling that she first noticed 2 days ago that has been steadily worsening since.  She reports that the symptoms are made drastically worse by touch as well as movements.  She does report some associated nausea but denies vomiting, fever/chills, chest pain, back pain, or known sick contacts.            PAST MEDICAL HISTORY  Active Ambulatory Problems     Diagnosis Date Noted    Bilateral hip pain 2016    Postpartum anemia 2019    Shoulder instability, right 2019    Allergic rhinitis 2016    Arthralgia 2016    Asthma 2016    Chest pain 2016    Congenital malformation of integument 2016    Diarrhea 2016    Headache 2016    Instability of joint 2016    Microscopic hematuria 2016     Resolved Ambulatory Problems     Diagnosis Date Noted    Pregnancy 2019     Past Medical History:   Diagnosis Date    Allergic     Anemia     Kenrick-Danlos disease     Heart murmur     Irritable bowel syndrome     Labral tear of shoulder     Migraine     Seasonal allergies          PAST SURGICAL HISTORY  Past Surgical History:   Procedure Laterality Date     SECTION N/A 2019    Procedure:  SECTION PRIMARY;  Surgeon: Jessica Jones MD;  Location: [facility] LABOR DELIVERY;  Service: Obstetrics/Gynecology    EAR TUBES      KNEE ARTHROSCOPY Right     SHOULDER ARTHROSCOPY W/ SUPERIOR LABRAL ANTERIOR POSTERIOR REPAIR Right 2019    Procedure: RIGHT SHOULDER ARTHROSCOPY WITH anterior labral repair;  Surgeon: Zan Rios MD;  Location: [facility] OR [facility];  Service: Orthopedics         FAMILY  HISTORY  Family History   Problem Relation Age of Onset    Hypertension Mother     Diabetes Mother     Lung disease Mother     Heart disease Mother     Cancer Mother     Cancer Paternal Uncle     Clotting disorder Maternal Grandmother     Cancer Maternal Grandfather     Hypertension Maternal Grandfather     Diabetes Paternal Grandmother     Lung disease Paternal Grandmother     Heart disease Paternal Grandmother     Cancer Paternal Grandfather     Hypertension Paternal Grandfather     Malig Hyperthermia Neg Hx          SOCIAL HISTORY  Social History     Socioeconomic History    Marital status:    Tobacco Use    Smoking status: Never    Smokeless tobacco: Never   Vaping Use    Vaping Use: Never used   Substance and Sexual Activity    Alcohol use: Not Currently     Comment: Glass of wine every couple months    Drug use: Never    Sexual activity: Yes     Partners: Male     Birth control/protection: Condom         ALLERGIES  Latex, Sulfa antibiotics, Codeine, and Levaquin [levofloxacin]        REVIEW OF SYSTEMS  Review of Systems   Constitutional:  Negative for fever.   HENT:  Negative for sore throat.    Eyes: Negative.    Respiratory:  Negative for cough and shortness of breath.    Cardiovascular:  Negative for chest pain.   Gastrointestinal:  Positive for abdominal pain and nausea. Negative for diarrhea and vomiting.   Genitourinary:  Negative for dysuria.   Musculoskeletal:  Negative for neck pain.   Skin:  Negative for rash.   Allergic/Immunologic: Negative.    Neurological:  Negative for weakness, numbness and headaches.   Hematological: Negative.    Psychiatric/Behavioral: Negative.     All other systems reviewed and are negative.         PHYSICAL EXAM  ED Triage Vitals [11/21/23 0029]   Temp Heart Rate Resp BP SpO2   97.9 °F (36.6 °C) 89 16 -- 97 %      Temp src Heart Rate Source Patient Position BP Location FiO2 (%)   Tympanic Monitor -- -- --       Physical Exam  Constitutional:       General: She is  not in acute distress.     Appearance: Normal appearance. She is not ill-appearing or toxic-appearing.   HENT:      Head: Normocephalic and atraumatic.   Eyes:      Extraocular Movements: Extraocular movements intact.      Pupils: Pupils are equal, round, and reactive to light.   Cardiovascular:      Rate and Rhythm: Normal rate and regular rhythm.      Heart sounds: No murmur heard.     No friction rub. No gallop.   Pulmonary:      Effort: Pulmonary effort is normal.      Breath sounds: Normal breath sounds.   Abdominal:      General: Abdomen is flat. There is no distension.      Palpations: Abdomen is soft.      Tenderness: There is no abdominal tenderness in the right lower quadrant.      Comments: No obvious hernia but right-sided groin tenderness noted   Musculoskeletal:         General: No swelling or tenderness. Normal range of motion.      Cervical back: Normal range of motion and neck supple.   Skin:     General: Skin is warm and dry.   Neurological:      General: No focal deficit present.      Mental Status: She is alert and oriented to person, place, and time.      Sensory: No sensory deficit.      Motor: No weakness.   Psychiatric:         Mood and Affect: Mood normal.         Behavior: Behavior normal.         Vital signs and nursing notes reviewed.          LAB RESULTS  No results found for this or any previous visit (from the past 24 hour(s)).    Ordered the above labs and reviewed the results.        RADIOLOGY  No Radiology Exams Resulted Within Past 24 Hours    Ordered the above noted radiological studies. Reviewed by me in PACS.            PROCEDURES  Procedures          MEDICATIONS GIVEN IN ER  Medications   morphine injection 4 mg (4 mg Intravenous Given 11/21/23 0114)   ondansetron (ZOFRAN) injection 4 mg (4 mg Intravenous Given 11/21/23 0114)   iopamidol (ISOVUE-300) 61 % injection 85 mL (85 mL Intravenous Given 11/21/23 0203)   ondansetron (ZOFRAN) injection 4 mg (4 mg Intravenous Given  11/21/23 0355)   morphine injection 4 mg (4 mg Intravenous Given 11/21/23 0355)   ampicillin-sulbactam (UNASYN) 3 g in sodium chloride 0.9 % 100 mL IVPB-VTB (0 g Intravenous Stopped 11/21/23 0516)                   MEDICAL DECISION MAKING, PROGRESS, and CONSULTS    All labs have been independently reviewed by me.  All radiology studies have been reviewed by me and I have also reviewed the radiology report.   EKG's independently viewed and interpreted by me.  Discussion below represents my analysis of pertinent findings related to patient's condition, differential diagnosis, treatment plan and final disposition.      Additional sources:  - Discussed/ obtained information from independent historians: History obtained from the patient as well as the patient's family at bedside.    - External (non-ED) record review: Upon medical records review, the patient was last seen and evaluated in the outpatient office of primary care on 2/7/2023 for an annual exam as well as in follow-up for her no migraines.    - Chronic or social conditions impacting care: None reported    - Shared decision making: Discharge decision based on shared conversations have between myself as well as the patient at bedside.      Orders placed during this visit:  Orders Placed This Encounter   Procedures    CT Abdomen Pelvis With Contrast    Comprehensive Metabolic Panel    hCG, Serum, Qualitative    Urinalysis With Microscopic If Indicated (No Culture) - Urine, Clean Catch    CBC Auto Differential    Urinalysis, Microscopic Only - Urine, Clean Catch    CBC & Differential           Differential diagnosis includes but is not limited to:    Differential diagnosis includes but is not limited to musculoskeletal/groin strain, inguinal hernia, acute appendicitis, urinary tract infection, or ureterolithiasis.      Independent interpretation of labs, radiology studies, and discussions with consultants:    CT scan of the abdomen and pelvis was independently  interpreted by myself with my interpretation showing no signs of hernia, bowel obstruction, or ureterolithiasis.        ED Course as of 11/24/23 0158   Tue Nov 21, 2023   0350 On reevaluation, the patient has reported that her pain has begun to return.  I informed her that her CT scan was consistent with cellulitis without sign of abscess formation.  We will retreat her discomfort and treat with a dose of IV antibiotics here.  Following this, we will place her on oral antibiotics for further treatment.  All questions have been answered. [BM]      ED Course User Index  [BM] Trae Mcpherson MD             DIAGNOSIS  Final diagnoses:   Right groin pain   Cellulitis of groin, right         DISPOSITION  DISCHARGE    Patient discharged in stable condition.    Reviewed implications of results, diagnosis, meds, responsibility to follow up, warning signs and symptoms of possible worsening, potential complications and reasons to return to ER.    Patient/Family voiced understanding of above instructions.    Discussed plan for discharge, as there is no emergent indication for admission. Patient referred to primary care provider for BP management due to today's BP. Pt/family is agreeable and understands need for follow up and repeat testing.  Pt is aware that discharge does not mean that nothing is wrong but it indicates no emergency is present that requires admission and they must continue care with follow-up as given below or physician of their choice.     FOLLOW-UP  No follow-up provider specified.       Medication List        New Prescriptions      amoxicillin-clavulanate 875-125 MG per tablet  Commonly known as: AUGMENTIN  Take 1 tablet by mouth 2 (Two) Times a Day.     HYDROcodone-acetaminophen 5-325 MG per tablet  Commonly known as: NORCO  Take 1 tablet by mouth Every 4 (Four) Hours As Needed for Moderate Pain.     ondansetron ODT 8 MG disintegrating tablet  Commonly known as: ZOFRAN-ODT  Place 1 tablet under the  tongue Every 8 (Eight) Hours As Needed for Vomiting or Nausea.            ASK your doctor about these medications      fluconazole 150 MG tablet  Commonly known as: DIFLUCAN  Take 1 tablet by mouth 1 (One) Time for 1 dose.  Ask about: Should I take this medication?               Where to Get Your Medications        These medications were sent to [facility] [id number] - [place], [place] - [address] [facility] AT [facility] [address] [place] - [phone]  - [phone]   [address] [facility], [facility] [address]      Phone: [phone]   amoxicillin-clavulanate 875-125 MG per tablet  fluconazole 150 MG tablet  HYDROcodone-acetaminophen 5-325 MG per tablet  ondansetron ODT 8 MG disintegrating tablet                   Latest Documented Vital Signs:  As of 01:58 EST  BP- 98/78 HR- 67 Temp- 97.9 °F (36.6 °C) (Tympanic) O2 sat- 97%              --    Please note that portions of this were completed with a voice recognition program.       Note Disclaimer: At [facility], we believe that sharing information builds trust and better relationships. You are receiving this note because you are receiving care at [facility] or recently visited. It is possible you will see health information before a provider has talked with you about it. This kind of information can be easy to misunderstand. To help you fully understand what it means for your health, we urge you to discuss this note with your provider.             Trae Mcpherson MD  11/24/23 0158

## 2024-01-12 DIAGNOSIS — G43.109 MIGRAINE WITH AURA AND WITHOUT STATUS MIGRAINOSUS, NOT INTRACTABLE: ICD-10-CM

## 2024-01-12 RX ORDER — RIMEGEPANT SULFATE 75 MG/75MG
1 TABLET, ORALLY DISINTEGRATING ORAL EVERY OTHER DAY
Qty: 16 TABLET | Refills: 0 | Status: SHIPPED | OUTPATIENT
Start: 2024-01-12

## 2024-01-12 NOTE — TELEPHONE ENCOUNTER
Caller: Farheen Chadwick    Relationship: Self    Best call back number: [phone]     Requested Prescriptions:   Requested Prescriptions     Pending Prescriptions Disp Refills    Rimegepant Sulfate (Nurtec) 75 MG tablet dispersible tablet 16 tablet 2        Pharmacy where request should be sent: [facility] [id number] [facility] [id number] AT [facility] [address] [facility] [phone] [facility] [phone] FX     Last office visit with prescribing clinician: 2/7/2023   Last telemedicine visit with prescribing clinician: Visit date not found   Next office visit with prescribing clinician: Visit date not found     Additional details provided by patient: PATIENT STATED OUT OF MEDICATION. PATIENT STATED THAT INSURANCE IS NEEDING MORE INFORMATION ON THIS PRESCRIPTION. PLEASE ADVISE.     Does the patient have less than a 3 day supply:  [x] Yes  [] No    Would you like a call back once the refill request has been completed: [] Yes [x] No    If the office needs to give you a call back, can they leave a voicemail: [] Yes [x] No    Mesfin Persaud Rep   01/12/24 15:28 EST         
Detail Level: Simple

## 2024-01-16 ENCOUNTER — TELEPHONE (OUTPATIENT)
Dept: INTERNAL MEDICINE | Facility: CLINIC | Age: 34
End: 2024-01-16
Payer: COMMERCIAL

## 2024-01-16 NOTE — TELEPHONE ENCOUNTER
Provider: NAOMIE ENCARNACION    Caller: SOTERO SERNA    Relationship to Patient: PATIENT    Pharmacy: [facility] [id number] - [place], [place] - [address] AT [facility] & [address] [facility] [phone] [facility] [phone]  [phone]     Phone Number:     [phone]        Reason for Call: PATIENT IS CALLING TO STATE SHE STILL HAS NOT BEEN ABLE TO  THE FOLLOWING MEDICATION.  SHE STATES THE INSURANCE COMPANY WANTS HER TO TRY DIFFERENT MEDICATIONS.  SHE STATES THE PHARMACY TOLD HER THAT MS ENCARNACION NEEDS TO CONTACT THE INSURANCE COMPANY.    PLEASE ADVISE.

## 2024-01-18 NOTE — TELEPHONE ENCOUNTER
Called patient and notified new script sent in, and sample for nurtec are up front for on sight migraines incase insurance dos not cover

## 2024-01-19 DIAGNOSIS — G43.109 MIGRAINE WITH AURA AND WITHOUT STATUS MIGRAINOSUS, NOT INTRACTABLE: Primary | ICD-10-CM

## 2024-01-19 RX ORDER — AMITRIPTYLINE HYDROCHLORIDE 25 MG/1
25 TABLET, FILM COATED ORAL NIGHTLY
Qty: 30 TABLET | Refills: 2 | Status: SHIPPED | OUTPATIENT
Start: 2024-01-19

## 2024-02-14 ENCOUNTER — PATIENT ROUNDING (BHMG ONLY) (OUTPATIENT)
Dept: URGENT CARE | Facility: CLINIC | Age: 34
End: 2024-02-14
Payer: COMMERCIAL

## 2025-03-26 ENCOUNTER — OFFICE VISIT (OUTPATIENT)
Dept: ORTHOPEDIC SURGERY | Facility: CLINIC | Age: 35
End: 2025-03-26
Payer: COMMERCIAL

## 2025-03-26 VITALS — HEIGHT: 64 IN | BODY MASS INDEX: 24.75 KG/M2 | TEMPERATURE: 98.3 F | WEIGHT: 145 LBS

## 2025-03-26 DIAGNOSIS — M25.511 CHRONIC RIGHT SHOULDER PAIN: Primary | ICD-10-CM

## 2025-03-26 DIAGNOSIS — G89.29 CHRONIC RIGHT SHOULDER PAIN: Primary | ICD-10-CM

## 2025-03-26 DIAGNOSIS — Z98.890 HISTORY OF ARTHROSCOPIC SURGERY OF SHOULDER: ICD-10-CM

## 2025-03-26 PROCEDURE — 99213 OFFICE O/P EST LOW 20 MIN: CPT | Performed by: NURSE PRACTITIONER

## 2025-03-26 RX ORDER — MELOXICAM 15 MG/1
15 TABLET ORAL DAILY
Qty: 30 TABLET | Refills: 1 | Status: SHIPPED | OUTPATIENT
Start: 2025-03-26

## 2025-03-26 NOTE — PROGRESS NOTES
Patient: Farheen Chadwick    YOB: 1990    Medical Record Number: [id number]    Chief Complaints:  Right shoulder pain    History of Present Illness:     35 y.o. female patient who presents with a complaint of right shoulder pain and motion loss.  She has a history of arthroscopic anterior labral repair by Dr. Rios in 2019.  She says the shoulder was doing very well until her pain recurred months ago. She says the pain significantly increased approximately 5-6 weeks ago.  She noticed significant motion loss around that time as well.  She works as a dance instructor and says her current shoulder issue significantly impacts her quality of life both professionally and personally.  Denies recent injury or any precipitating factor she can recall.  Pain is moderate to severe, constant and aching.  She reports intermittent popping, but says this is not particularly painful.  She admits her pain does feel different than what she was experiencing prior to her previous surgery.  The pain is worse with certain reaching and lifting movements and bearing weight.  She has tried Advil, Tylenol, ice, and heat without relief.  She denies any shooting pain down the arm, distal weakness, numbness or paresthesias.  Patient is right-hand dominant.  Of note, she mentions she is very concerned about the possibility she could have Kenrick-Danlos syndrome.  This has never been investigated.  She has seen a rheumatologist for chronic joint pains and had extensive evaluation with laboratory testing which reportedly was normal.  She is considering following up with a  and is asking if I have any recommendations in that regard.    Allergies:   Allergies   Allergen Reactions    Latex Anaphylaxis    Sulfa Antibiotics Unknown (See Comments)     Happened when she was a baby    Codeine Hallucinations    Levaquin [Levofloxacin] Swelling       Home Medications:    Current Outpatient Medications:     albuterol sulfate   (90 Base) MCG/ACT inhaler, Inhale 2 puffs Every 4 (Four) Hours As Needed for Wheezing., Disp: 8 g, Rfl: 3    cetirizine (zyrTEC) 10 MG tablet, Take 1 tablet by mouth Daily., Disp: , Rfl:     azithromycin (Zithromax Z-Dion) 250 MG tablet, Take 2 tablets by mouth on day 1, then 1 tablet daily on days 2-5 (Patient not taking: Reported on 3/26/2025), Disp: 6 tablet, Rfl: 0    Ibuprofen 3 %, Gabapentin 10 %, Baclofen 2 %, lidocaine 4 %, Apply 1-2 g topically to the appropriate area as directed 3 (Three) to 4 (Four) times daily., Disp: 90 g, Rfl: 5    Loratadine (CLARITIN PO), Take 1 tablet by mouth Daily. (Patient not taking: Reported on 3/26/2025), Disp: , Rfl:     meloxicam (Mobic) 15 MG tablet, Take 1 tablet by mouth Daily., Disp: 30 tablet, Rfl: 1    Past Medical History:   Diagnosis Date    Allergic     Latex    Anemia     Ankle sprain     Asthma     no inhaler    Chronic constipation     Dislocation of finger     Dislocation, shoulder ?    Could dislocate it myself all my life. Had surgery on it in     Kenrick-Danlos disease     Heart murmur     benign regurgitation    Irritable bowel syndrome     Labral tear of shoulder     RIGHT    Migraine     Neck strain     Scoliosis ?    I’ve been told i had a slight curvature but never treated    Seasonal allergies     Subluxation of patella     Tear of meniscus of knee     Had meniscus repair in 2016    Tendinitis of knee        Past Surgical History:   Procedure Laterality Date     SECTION N/A 2019    Procedure:  SECTION PRIMARY;  Surgeon: Jessica Jones MD;  Location: [facility] LABOR DELIVERY;  Service: Obstetrics/Gynecology    EAR TUBES      KNEE ARTHROSCOPY Right     KNEE SURGERY      Meniscus    SHOULDER ARTHROSCOPY W/ SUPERIOR LABRAL ANTERIOR POSTERIOR REPAIR Right 2019    Procedure: RIGHT SHOULDER ARTHROSCOPY WITH anterior labral repair;  Surgeon: Zan Rios MD;  Location: [facility] OR [facility];  Service:  Orthopedics    SHOULDER SURGERY  2019    Labrum       Social History     Occupational History    Not on file   Tobacco Use    Smoking status: Never    Smokeless tobacco: Never    Tobacco comments:     Never   Vaping Use    Vaping status: Never Used   Substance and Sexual Activity    Alcohol use: Not Currently     Comment: Drink very rarely    Drug use: Never    Sexual activity: Yes     Partners: Male     Birth control/protection: Condom      Social History     Social History Narrative    Not on file       Family History   Problem Relation Age of Onset    Hypertension Mother     Diabetes Mother     Lung disease Mother     Heart disease Mother     Cancer Mother         My mom had uterine cancer that spread.    Rheumatologic disease Father         My dad has rheumatoid arthritis    Cancer Paternal Uncle     Clotting disorder Maternal Grandmother     Cancer Maternal Grandfather     Hypertension Maternal Grandfather     Diabetes Paternal Grandmother     Lung disease Paternal Grandmother     Heart disease Paternal Grandmother     Cancer Paternal Grandfather     Hypertension Paternal Grandfather     Malig Hyperthermia Neg Hx        Review of Systems:      Constitutional: Denies fever, shaking or chills   Eyes: Denies change in visual acuity   HEENT: Denies nasal congestion or sore throat   Respiratory: Denies cough or shortness of breath   Cardiovascular: Denies chest pain or edema  Endocrine: Denies tremors, palpitations, intolerance of heat or cold, polyuria, polydipsia.  GI: Denies abdominal pain, nausea, vomiting, bloody stools or diarrhea  : Denies frequency, urgency, incontinence, retention, or nocturia.  Musculoskeletal: Denies numbness, tingling or loss of motor function except as above  Integument: Denies rash, lesion or ulceration   Neurologic: Denies headache or focal weakness, deficits  Heme: Denies spontaneous or excessive bleeding, epistaxis, hematuria, melena, fatigue, enlarged or tender lymph nodes.     "  All other pertinent positives and negatives as noted above in HPI.    Physical Exam:   35 y.o. female    Vitals:    03/26/25 0855   Temp: 98.3 °F (36.8 °C)   TempSrc: Temporal   Weight: 65.8 kg (145 lb)   Height: 162.6 cm (64\")     General:  Patient is awake and alert.  Appears in no acute distress or discomfort.    Psych:  Affect and demeanor are appropriate.    Eyes:  Conjunctiva and sclera appear grossly normal.  Eyes track well and EOM seem to be intact.    Ears:  No gross abnormalities.  Hearing adequate for the exam.    Cardiovascular:  Regular rate and rhythm.    Lungs:  Good chest expansion.  Breathing unlabored.    Lymph:  No palpable adenopathy about neck or axilla.    Neck:  Supple.  Normal ROM.  Negative Spurling's for shoulder or arm pain.    Right upper extremity:  Skin is benign.  No gross abnormalities on inspection including any atrophy, swellings, or masses.  No palpable masses or adenopathy.  Mild tenderness anteriorly and over the scapula.  Active motion is limited and uncomfortable:  120° FE, 50° ER, 110° abduction, IR to T12.  Her passive motion is near full, but not well tolerated. No evident instability or apprehension.  Mildly positive Neer, Nieves.  Positive [place]'s.  Unable to fully assess rotator cuff strength due to guarded exam.  Good strength in the deltoid, biceps, triceps, and .  Intact sensation throughout the arm.  Brisk cap refill.  Palpable radial pulse.  Good skin turgor.         Radiology:   AP, scapular Y, and axillary views of the right shoulder are ordered by myself and reviewed to evaluate the patient's complaint.  No comparison films are immediately available.  The x-rays show post surgical changes.  There are no obvious acute abnormalities, lesions, masses, significant degenerative changes, or other concerning findings.  The acromiohumeral interval is normal.  Glenoid version appears normal as well.    Assessment/Plan:   Chronic right shoulder pain, suspected " rotator cuff tendinitis, possible recurrent labral tear     We reviewed her x-rays together.  I explained I am concerned about possible rotator cuff tendinitis or even a recurrent labral tear based on exam.  Her exam is guarded so I am unable to fully assess her strength.  I think a rotator cuff tear is unlikely since she has not experienced any trauma.  We thoroughly discussed options in detail including conservative versus further work-up and potential surgical options. She has acknowledged understanding of this information. She wants to pursue further work-up and potential surgical options.  I have recommended further work-up with an MR arthrogram. I instructed the patient that I will call with the results and we will come up with a plan pending review of that study.  In the interim, patient encouraged to move and use the arm as normally as possible.  She may continue conservative treatments with Tylenol, ice, or heat.  I have given her a prescription for a topical pain/anti-inflammatory cream through [facility].  Additionally, I have given her a prescription for meloxicam. The risk and dosing instructions for these medications were discussed.  Regarding the referral for a , I recommended she follow-up with Ms. Byrd for recommendations as I am not familiar with any in this area.    GEORGIA Byrd    03/26/2025    CC to Katarzyna Byrd APRN    Much of this encounter note is an electronic transcription/translation of spoken language to printed text. The electronic translation of spoken language may permit erroneous, or at times, nonsensical words or phrases to be inadvertently transcribed.  Although I have reviewed the note for such errors, some may still exist.

## 2025-04-17 ENCOUNTER — HOSPITAL ENCOUNTER (OUTPATIENT)
Dept: MRI IMAGING | Facility: HOSPITAL | Age: 35
Discharge: HOME | End: 2025-04-17
Payer: COMMERCIAL

## 2025-04-17 ENCOUNTER — HOSPITAL ENCOUNTER (OUTPATIENT)
Dept: GENERAL RADIOLOGY | Facility: HOSPITAL | Age: 35
Discharge: HOME | End: 2025-04-17
Payer: COMMERCIAL

## 2025-04-17 DIAGNOSIS — G89.29 CHRONIC RIGHT SHOULDER PAIN: ICD-10-CM

## 2025-04-17 DIAGNOSIS — Z98.890 HISTORY OF ARTHROSCOPIC SURGERY OF SHOULDER: ICD-10-CM

## 2025-04-17 DIAGNOSIS — M25.511 CHRONIC RIGHT SHOULDER PAIN: ICD-10-CM

## 2025-04-17 PROCEDURE — 25010000002 BUPIVACAINE (PF) 0.25 % SOLUTION: Performed by: NURSE PRACTITIONER

## 2025-04-17 PROCEDURE — 25010000002 LIDOCAINE 1 % SOLUTION: Performed by: NURSE PRACTITIONER

## 2025-04-17 PROCEDURE — 25510000002 GADOBENATE DIMEGLUMINE 529 MG/ML SOLUTION: Performed by: NURSE PRACTITIONER

## 2025-04-17 PROCEDURE — 25510000001 IOPAMIDOL 61 % SOLUTION: Performed by: NURSE PRACTITIONER

## 2025-04-17 PROCEDURE — A9577 INJ MULTIHANCE: HCPCS | Performed by: NURSE PRACTITIONER

## 2025-04-17 PROCEDURE — 73222 MRI JOINT UPR EXTREM W/DYE: CPT

## 2025-04-17 PROCEDURE — 77002 NEEDLE LOCALIZATION BY XRAY: CPT

## 2025-04-17 RX ORDER — BUPIVACAINE HYDROCHLORIDE 2.5 MG/ML
10 INJECTION, SOLUTION EPIDURAL; INFILTRATION; INTRACAUDAL; PERINEURAL ONCE
Status: COMPLETED | OUTPATIENT
Start: 2025-04-17 | End: 2025-04-17

## 2025-04-17 RX ORDER — IOPAMIDOL 612 MG/ML
30 INJECTION, SOLUTION INTRAVASCULAR
Status: COMPLETED | OUTPATIENT
Start: 2025-04-17 | End: 2025-04-17

## 2025-04-17 RX ORDER — LIDOCAINE HYDROCHLORIDE 10 MG/ML
10 INJECTION, SOLUTION INFILTRATION; PERINEURAL ONCE
Status: COMPLETED | OUTPATIENT
Start: 2025-04-17 | End: 2025-04-17

## 2025-04-17 RX ADMIN — GADOBENATE DIMEGLUMINE 0.1 ML: 529 INJECTION, SOLUTION INTRAVENOUS at 09:48

## 2025-04-17 RX ADMIN — IOPAMIDOL 7 ML: 612 INJECTION, SOLUTION INTRAVENOUS at 09:48

## 2025-04-17 RX ADMIN — BUPIVACAINE HYDROCHLORIDE 3 ML: 2.5 INJECTION, SOLUTION EPIDURAL; INFILTRATION; INTRACAUDAL; PERINEURAL at 09:48

## 2025-04-17 RX ADMIN — LIDOCAINE HYDROCHLORIDE 2 ML: 10 INJECTION, SOLUTION INFILTRATION; PERINEURAL at 09:48

## 2025-04-21 ENCOUNTER — RESULTS FOLLOW-UP (OUTPATIENT)
Dept: ORTHOPEDIC SURGERY | Facility: CLINIC | Age: 35
End: 2025-04-21
Payer: COMMERCIAL

## 2025-04-21 NOTE — TELEPHONE ENCOUNTER
I spoke to Ms. Chadwick.  I provided her with the right shoulder MR arthrogram results.  She appears to have a recurrent labral tear and irritation of the rotator cuff.  I asked if she recalls ever having a dislocation and she says that when she lies on her right side if her weight shifts forward or backward the shoulder seems to come out.  She says this has been happening her entire life.  She says the left shoulder does not as well, but not as often as the right.  I recommended she follow-up in clinic with Dr. Rios for evaluation and detailed discussion of the MRI findings.  She agreed.  I will have a staff member call her to schedule this appointment.

## 2025-04-28 ENCOUNTER — OFFICE VISIT (OUTPATIENT)
Dept: ORTHOPEDIC SURGERY | Facility: CLINIC | Age: 35
End: 2025-04-28
Payer: COMMERCIAL

## 2025-04-28 VITALS — HEIGHT: 64 IN | WEIGHT: 152.4 LBS | TEMPERATURE: 98.2 F | BODY MASS INDEX: 26.02 KG/M2

## 2025-04-28 DIAGNOSIS — S43.431A GLENOID LABRAL TEAR, RIGHT, INITIAL ENCOUNTER: Primary | ICD-10-CM

## 2025-04-28 PROCEDURE — 99214 OFFICE O/P EST MOD 30 MIN: CPT | Performed by: ORTHOPAEDIC SURGERY

## 2025-04-28 NOTE — PROGRESS NOTES
Patient:Farheen Chadwick    YOB: 1990    Medical Record Number:[id number]    Chief Complaints: Right shoulder pain    History of Present Illness:     35 y.o. female patient who presents for her right shoulder.  She is known to me from her previous labral repair approximately 5 years ago.  She was having instability at the time.  We performed an anterior labral repair and she says that she did great after that surgery.  She says she was pain-free and did not have any further instability.  This past Joellen, she says the shoulder started to bother her again.  She cannot recall any injury, inciting event or factor.  The pain seems to have steadily gotten worse over the past few months.  She reports anterior pain which is constant but worse whenever she tries to reach or lift.  She says that she can reach behind her body or crossed without any pain whatsoever.  When she tries to reach up or out is when it hurts.  She describes the pain as shooting through the front of the shoulder and then radiating down into the area of her biceps.  She says that she has not had another dislocation but she does feel like the shoulder slips at times.  She says that she frequently wakes up at night with pain.  Denies any alleviating factors other than rest.  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    Allergies   Allergen Reactions    Latex Anaphylaxis    Sulfa Antibiotics Unknown (See Comments)     Happened when she was a baby    Codeine Hallucinations    Levaquin [Levofloxacin] Swelling       Current Outpatient Medications:     albuterol sulfate  (90 Base) MCG/ACT inhaler, Inhale 2 puffs Every 4 (Four) Hours As Needed for Wheezing., Disp: 8 g, Rfl: 3    cetirizine (zyrTEC) 10 MG tablet, Take 1 tablet by mouth Daily., Disp: , Rfl:     Ibuprofen 3 %, Gabapentin 10 %, Baclofen 2 %, lidocaine 4 %, Apply 1-2 g topically to the appropriate area as directed 3 (Three) to 4 (Four)  times daily., Disp: 90 g, Rfl: 5    meloxicam (Mobic) 15 MG tablet, Take 1 tablet by mouth Daily., Disp: 30 tablet, Rfl: 1    azithromycin (Zithromax Z-Dion) 250 MG tablet, Take 2 tablets by mouth on day 1, then 1 tablet daily on days 2-5 (Patient not taking: Reported on 2025), Disp: 6 tablet, Rfl: 0    Loratadine (CLARITIN PO), Take 1 tablet by mouth Daily. (Patient not taking: Reported on 2025), Disp: , Rfl:     Past Medical History:   Diagnosis Date    Allergic     Latex    Anemia     Ankle sprain     Asthma     no inhaler    Chronic constipation     Dislocation of finger     Dislocation, shoulder ?    Could dislocate it myself all my life. Had surgery on it in     Kenrick-Danlos disease     Heart murmur     benign regurgitation    Irritable bowel syndrome     Labral tear of shoulder     RIGHT    Migraine     Neck strain     Scoliosis ?    I’ve been told i had a slight curvature but never treated    Seasonal allergies     Subluxation of patella     Tear of meniscus of knee     Had meniscus repair in     Tendinitis of knee        Past Surgical History:   Procedure Laterality Date     SECTION N/A 2019    Procedure:  SECTION PRIMARY;  Surgeon: Jessica Jones MD;  Location: [facility] LABOR DELIVERY;  Service: Obstetrics/Gynecology    EAR TUBES      KNEE ARTHROSCOPY Right     KNEE SURGERY      Meniscus    SHOULDER ARTHROSCOPY W/ SUPERIOR LABRAL ANTERIOR POSTERIOR REPAIR Right 2019    Procedure: RIGHT SHOULDER ARTHROSCOPY WITH anterior labral repair;  Surgeon: Zan Rios MD;  Location: [facility] OR [facility] [place];  Service: Orthopedics    SHOULDER SURGERY  2019    Labrum       Social History     Occupational History    Not on file   Tobacco Use    Smoking status: Never    Smokeless tobacco: Never    Tobacco comments:     Never   Vaping Use    Vaping status: Never Used   Substance and Sexual Activity    Alcohol use: Not Currently     Comment: Drink very rarely    Drug  "use: Never    Sexual activity: Yes     Partners: Male     Birth control/protection: Condom      Social History     Social History Narrative    Not on file       Family History   Problem Relation Age of Onset    Hypertension Mother     Diabetes Mother     Lung disease Mother     Heart disease Mother     Cancer Mother         My mom had uterine cancer that spread.    Rheumatologic disease Father         My dad has rheumatoid arthritis    Cancer Paternal Uncle     Clotting disorder Maternal Grandmother     Cancer Maternal Grandfather     Hypertension Maternal Grandfather     Diabetes Paternal Grandmother     Lung disease Paternal Grandmother     Heart disease Paternal Grandmother     Cancer Paternal Grandfather     Hypertension Paternal Grandfather     Malig Hyperthermia Neg Hx        Review of Systems:      Constitutional: Denies fever, shaking or chills   Eyes: Denies change in visual acuity   HEENT: Denies nasal congestion or sore throat   Respiratory: Denies cough or shortness of breath   Cardiovascular: Denies chest pain or edema  Endocrine: Denies tremors, palpitations, intolerance of heat or cold, polyuria, polydipsia.  GI: Denies abdominal pain, nausea, vomiting, bloody stools or diarrhea  : Denies frequency, urgency, incontinence, retention, or nocturia.  Musculoskeletal: Denies numbness, tingling or loss of motor function except as above  Integument: Denies rash, lesion or ulceration   Neurologic: Denies headache or focal weakness, deficits  Heme: Denies spontaneous or excessive bleeding, epistaxis, hematuria, melena, fatigue, enlarged or tender lymph nodes.      All other pertinent positives and negatives as noted above in HPI.    Physical Exam:35 y.o. female  Vitals:    04/28/25 1116   Temp: 98.2 °F (36.8 °C)   Weight: 69.1 kg (152 lb 6.4 oz)   Height: 162.6 cm (64\")     General:  Patient is awake and alert.  Appears in no acute distress or discomfort.    Psych:  Affect and demeanor are " appropriate.    Extremities: Right shoulder is examined.  Skin is benign.  She has moderate tenderness anteriorly over the rotator interval and upper biceps groove.  She has full motion but she really struggles to raise her arm in front of her body.  She can easily reach behind and across but anytime she tries to reach up or out is very painful for her across the front of the shoulder.  Passively, I can help her raise it all the way up but it is very uncomfortable as well.  Bringing her into abduction and external rotation is painful for her and I cannot tell if she is apprehensive or not.  A relocation maneuver does not really seem to change her pain.  She can abduct and elevate against resistance with good strength although both are uncomfortable.  Speeds and Yergason's maneuvers are both very painful for her.  With her elbow at the side she can internally and externally rotate against resistance with good strength and no pain.  Normal motor and sensory function in her lower arm and hand.  Palpable radial pulse.    Imaging: Her previous x-rays and her recent MRI reviewed.  The x-rays show postsurgical changes consistent with her history of labral repair.  I do not see any acute abnormalities or other concerning findings.  Her glenoid looks fine as best I can tell.    Her recent MRI is reviewed along with the report.  I have independently interpreted the images.  I do not see any significant glenoid bone loss.  She has what appears to be recurrent tearing of the anterior labrum with some stripping of the periosteum.  She has some rotator cuff tendinopathy and low-grade partial-thickness tearing of the rotator cuff.  Her biceps appears very diminutive and on the coronal images it looks to me like she has some fraying and partial tearing.  The radiologist did not comment on this.    Assessment/Plan: Recurrent right glenoid labral tear, suspected biceps tendinopathy with rotator cuff tendinopathy and partial-thickness  "tearing    We had a long discussion and reviewed her MRI together.  This is a weird situation.  She did great after the previous arthroscopic surgery and has not had another dislocation.  Last 6 months or so she has been having these symptoms but there was no injury or precipitating factor.  I cannot tell if she is unstable or not.  She reports \"slipping\" of the shoulder but she has not had another dislocation.  To me it does not look like she is missing much glenoid bone.  I think a bony transfer is unnecessary at this point.  I told her I could be wrong about that.  If so, another arthroscopic surgery is probably not going to help.  She appears to have a recurrent labral tear and I also think her biceps is probably a major part of her pain.  If so, going back into fix the labrum and address the biceps should help her.  If she does have a significant anterior labral tear and stripping of the periosteum as the MRI suggests then I would obviously fix this but I would plan to add a remplissage which hopefully would minimize her risk of recurrent problems.  I explained that this would also allow us to measure her bone loss.  If she has significant bone loss and the revision arthroscopic surgery fails then she would be looking at a bony transfer procedure.  Again, I just think that is too aggressive to consider right now.  I explained all this to her in layman's terms.  She acknowledged understanding.  She wants to avoid the bigger surgery if at all possible and wants to move forward with the arthroscopy.    We had a thorough discussion regarding the risks, benefits and alternatives to an arthroscopic labral repair versus debridement with remplissage, possible biceps tenotomy versus tenodesis, possible rotator cuff debridement versus repair.  I explained that surgical risks include infection, hematoma, anchor related complications including failure of fixation, loosening, cutout of the anchors, chondrolysis,  re-tear or " persistent symptoms necessitating revision surgery, persistent pain and/or loss of motion and/or instability, iatrogenic nerve and/or blood vessel injury resulting in permanent weakness, numbness or dysfunction, RSD, DVT, PE, positioning related neuropraxia, and anesthesia related complications resulting in death.  We further discussed the possible need to address the biceps with a possible tenotomy versus tenodesis.  We discussed the fact that if the biceps demonstrates significant pathology in the groove then I would plan to perform a tenotomy which could result in rigoberto deformity or persistent pain, weakness or cramping.  We also discussed possible risks with a tenodesis as well including screw related complications including cutout, chondrolysis, failure of fixation with re-tear of the biceps, fracture and possible iatrogenic nerve injury.  The patient voiced understanding of the risks, benefits, and alternative forms of treatment that were discussed and consents to proceed.      Zan Rios MD    04/28/2025

## 2025-06-10 ENCOUNTER — TELEPHONE (OUTPATIENT)
Dept: ORTHOPEDIC SURGERY | Facility: CLINIC | Age: 35
End: 2025-06-10
Payer: COMMERCIAL

## 2025-06-10 NOTE — TELEPHONE ENCOUNTER
Caller: Farheen Chadwick    Relationship to patient: Self    Best call back number: [phone]*    Patient is needing: PT IS CALLING BECAUSE SHE RECEIVED A LETTER IN THE MAIL STATING THAT HER SX HAS NOT BEEN APPROVED.. IT IS STATING THAT THEY NEED RECENT IMAGING REPORTS AND OFFICE NOTES .. PLEASE ADVISE..

## 2025-06-11 NOTE — TELEPHONE ENCOUNTER
Call returned to patient. I explained that I will fax the information her insurance company is needing and will follow up with her concerning the outcome.

## 2025-06-24 ENCOUNTER — PRE-ADMISSION TESTING (OUTPATIENT)
Dept: PREADMISSION TESTING | Facility: HOSPITAL | Age: 35
End: 2025-06-24
Payer: COMMERCIAL

## 2025-06-24 VITALS
WEIGHT: 155 LBS | BODY MASS INDEX: 26.46 KG/M2 | OXYGEN SATURATION: 97 % | HEIGHT: 64 IN | DIASTOLIC BLOOD PRESSURE: 91 MMHG | SYSTOLIC BLOOD PRESSURE: 131 MMHG | RESPIRATION RATE: 16 BRPM | HEART RATE: 87 BPM | TEMPERATURE: 97.8 F

## 2025-06-24 LAB
ANION GAP SERPL CALCULATED.3IONS-SCNC: 10 MMOL/L (ref 5–15)
BUN SERPL-MCNC: 9 MG/DL (ref 6–20)
BUN/CREAT SERPL: 9.3 (ref 7–25)
CALCIUM SPEC-SCNC: 9.5 MG/DL (ref 8.6–10.5)
CHLORIDE SERPL-SCNC: 105 MMOL/L (ref 98–107)
CO2 SERPL-SCNC: 21 MMOL/L (ref 22–29)
CREAT SERPL-MCNC: 0.97 MG/DL (ref 0.57–1)
DEPRECATED RDW RBC AUTO: 46.2 FL (ref 37–54)
EGFRCR SERPLBLD CKD-EPI 2021: 77.8 ML/MIN/1.73
ERYTHROCYTE [DISTWIDTH] IN BLOOD BY AUTOMATED COUNT: 13.4 % (ref 12.3–15.4)
GLUCOSE SERPL-MCNC: 101 MG/DL (ref 65–99)
HCT VFR BLD AUTO: 42.3 % (ref 34–46.6)
HGB BLD-MCNC: 13.4 G/DL (ref 12–15.9)
MCH RBC QN AUTO: 29.9 PG (ref 26.6–33)
MCHC RBC AUTO-ENTMCNC: 31.7 G/DL (ref 31.5–35.7)
MCV RBC AUTO: 94.4 FL (ref 79–97)
PLATELET # BLD AUTO: 293 10*3/MM3 (ref 140–450)
PMV BLD AUTO: 10.3 FL (ref 6–12)
POTASSIUM SERPL-SCNC: 3.9 MMOL/L (ref 3.5–5.2)
RBC # BLD AUTO: 4.48 10*6/MM3 (ref 3.77–5.28)
SODIUM SERPL-SCNC: 136 MMOL/L (ref 136–145)
WBC NRBC COR # BLD AUTO: 5.88 10*3/MM3 (ref 3.4–10.8)

## 2025-06-24 PROCEDURE — 36415 COLL VENOUS BLD VENIPUNCTURE: CPT

## 2025-06-24 PROCEDURE — 80048 BASIC METABOLIC PNL TOTAL CA: CPT

## 2025-06-24 PROCEDURE — 85027 COMPLETE CBC AUTOMATED: CPT

## 2025-06-24 RX ORDER — IBUPROFEN 200 MG
200 TABLET ORAL EVERY 6 HOURS PRN
COMMUNITY

## 2025-06-24 RX ORDER — DIPHENOXYLATE HYDROCHLORIDE AND ATROPINE SULFATE 2.5; .025 MG/1; MG/1
1 TABLET ORAL DAILY
COMMUNITY

## 2025-06-24 NOTE — DISCHARGE INSTRUCTIONS
Take the following medications the morning of surgery:    NONE    If you are on an Aspirin or a Blood Thinner please clarify with the surgeon and prescribing physician if and when you are to hold the medication or if you are to continue the medication.  If you are on prescription narcotic pain medication to control your pain you may also take that medication the morning of surgery.      General Instructions:     Do not eat solid food after midnight the night before surgery.  Clear liquids day of surgery are allowed but must be stopped at least two hours before your hospital arrival time.       Allowed clear liquids      Water, sodas, and tea or coffee with no cream or milk added.       12 to 20 ounces of a clear liquid that contains carbohydrates is recommended.  If non-diabetic, have Gatorade or Powerade.  If diabetic, have G2 or Powerade Zero.     Do not have liquids red in color.  Do not consume chicken, beef, pork or vegetable broth or bouillon cubes of any variety as they are not considered clear liquids and are not allowed.      Infants may have breast milk up to four hours before surgery.  Infants drinking formula may drink formula up to six hours before surgery.   Patients who avoid smoking, chewing tobacco and alcohol for 4 weeks prior to surgery have a reduced risk of post-operative complications.  Quit smoking as many days before surgery as you can.  Do not smoke, use chewing tobacco or drink alcohol the day of surgery.   If applicable bring your C-PAP/ BI-PAP machine in with you to preop day of surgery.  Bring any papers given to you in the doctor’s office.  Wear clean comfortable clothes.  Do not wear contact lenses, false eyelashes or make-up.  Bring a case for your glasses.   Bring crutches or walker if applicable.  Remove all piercings.  Leave jewelry and any other valuables at home.  Hair extensions with metal clips must be removed prior to surgery.  The Pre-Admission Testing nurse will instruct you  to bring medications if unable to obtain an accurate list in Pre-Admission Testing.    Day of surgery you will need to let the preoperative nurse know the last time you took each of your medications.  To ensure a safe environment for patients and staff, we kindly ask that children under the age of 16 not accompany patients.  If you must bring a dependent child or dependent adult please ensure a responsible adult, other than yourself, is present to supervise them.      If you were given a blood bank ID arm band remember to bring it with you the day of surgery.    Preventing a Surgical Site Infection:  For 2 to 3 days before surgery, avoid shaving with a razor because the razor can irritate skin and make it easier to develop an infection.    Any areas of open skin can increase the risk of a post-operative wound infection by allowing bacteria to enter and travel throughout the body.  Notify your surgeon if you have any skin wounds / rashes even if it is not near the expected surgical site.  The area will need assessed to determine if surgery should be delayed until it is healed.  The night prior to surgery shower using a fresh bar of anti-bacterial soap (such as Dial) and clean washcloth.  Sleep in a clean bed with clean clothing.  Do not allow pets to sleep with you.  Shower on the morning of surgery using a fresh bar of anti-bacterial soap (such as Dial) and clean washcloth.  Dry with a clean towel and dress in clean clothing.  Ask your surgeon if you will be receiving antibiotics prior to surgery.  Make sure you, your family, and all healthcare providers clean their hands with soap and water or an alcohol based hand  before caring for you or your wound.    Day of surgery:  Your arrival time is approximately two hours before your scheduled surgery time.  Please note if you have an early arrival time the surgery doors do not open before 5:00 AM.  Upon arrival, a Pre-op nurse and Anesthesiologist will review  your health history, obtain vital signs, and answer questions you may have.  The only belongings needed at this time will be a list of your home medications and if applicable your C-PAP/BI-PAP machine.  A Pre-op nurse will start an IV and you may receive medication in preparation for surgery, including something to help you relax.     Please be aware that surgery does come with discomfort.  We want to make every effort to control your discomfort so please discuss any uncontrolled symptoms with your nurse.   Your doctor will most likely have prescribed pain medications.      If you are going home after surgery you will receive individualized written care instructions before being discharged.  A responsible adult must drive you to and from the hospital on the day of your surgery and ideally stay with you through the night.   .  Discharge prescriptions can be filled by the hospital pharmacy during regular pharmacy hours.  If you are having surgery late in the day/evening your prescription may be e-prescribed to your pharmacy.  Please verify your pharmacy hours or chose a 24 hour pharmacy to avoid not having access to your prescription because your pharmacy has closed for the day.    If you are staying overnight following surgery, you will be transported to your hospital room following the recovery period.  [facility] has all private rooms.    If you have any questions please call Pre-Admission Testing at [phone].  Deductibles and co-payments are collected on the day of service. Please be prepared to pay the required co-pay, deductible or deposit on the day of service as defined by your plan.    Call your surgeon immediately if you experience any of the following symptoms:  Sore Throat  Shortness of Breath or difficulty breathing  Cough  Chills  Body soreness or muscle pain  Headache  Fever  New loss of taste or smell  Do not arrive for your surgery ill.  Your procedure will need to be rescheduled  to another time.  You will need to call your physician before the day of surgery to avoid any unnecessary exposure to hospital staff as well as other patients.

## 2025-07-01 ENCOUNTER — ANESTHESIA EVENT (OUTPATIENT)
Dept: PERIOP | Facility: HOSPITAL | Age: 35
End: 2025-07-01
Payer: COMMERCIAL

## 2025-07-01 ENCOUNTER — ANESTHESIA (OUTPATIENT)
Dept: PERIOP | Facility: HOSPITAL | Age: 35
End: 2025-07-01
Payer: COMMERCIAL

## 2025-07-01 ENCOUNTER — HOSPITAL ENCOUNTER (OUTPATIENT)
Facility: HOSPITAL | Age: 35
Setting detail: HOSPITAL OUTPATIENT SURGERY
Discharge: HOME | End: 2025-07-01
Attending: ORTHOPAEDIC SURGERY | Admitting: ORTHOPAEDIC SURGERY
Payer: COMMERCIAL

## 2025-07-01 VITALS
DIASTOLIC BLOOD PRESSURE: 81 MMHG | HEART RATE: 80 BPM | SYSTOLIC BLOOD PRESSURE: 121 MMHG | TEMPERATURE: 97.6 F | RESPIRATION RATE: 16 BRPM | OXYGEN SATURATION: 98 %

## 2025-07-01 DIAGNOSIS — S43.431A GLENOID LABRAL TEAR, RIGHT, INITIAL ENCOUNTER: ICD-10-CM

## 2025-07-01 LAB
B-HCG UR QL: NEGATIVE
EXPIRATION DATE: NORMAL
INTERNAL NEGATIVE CONTROL: NORMAL
INTERNAL POSITIVE CONTROL: NORMAL
Lab: NORMAL

## 2025-07-01 PROCEDURE — 25810000003 LACTATED RINGERS PER 1000 ML: Performed by: STUDENT IN AN ORGANIZED HEALTH CARE EDUCATION/TRAINING PROGRAM

## 2025-07-01 PROCEDURE — 25010000002 DEXAMETHASONE SODIUM PHOSPHATE 20 MG/5ML SOLUTION: Performed by: NURSE ANESTHETIST, CERTIFIED REGISTERED

## 2025-07-01 PROCEDURE — 25010000002 MIDAZOLAM PER 1 MG: Performed by: STUDENT IN AN ORGANIZED HEALTH CARE EDUCATION/TRAINING PROGRAM

## 2025-07-01 PROCEDURE — 25010000002 BUPIVACAINE LIPOSOME 1.3 % SUSPENSION: Performed by: STUDENT IN AN ORGANIZED HEALTH CARE EDUCATION/TRAINING PROGRAM

## 2025-07-01 PROCEDURE — 81025 URINE PREGNANCY TEST: CPT | Performed by: STUDENT IN AN ORGANIZED HEALTH CARE EDUCATION/TRAINING PROGRAM

## 2025-07-01 PROCEDURE — 29828 SHO ARTHRS SRG BICP TENODSIS: CPT | Performed by: TECHNICIAN, OTHER

## 2025-07-01 PROCEDURE — 25010000002 PROPOFOL 200 MG/20ML EMULSION: Performed by: NURSE ANESTHETIST, CERTIFIED REGISTERED

## 2025-07-01 PROCEDURE — 25010000002 SUGAMMADEX 200 MG/2ML SOLUTION: Performed by: NURSE ANESTHETIST, CERTIFIED REGISTERED

## 2025-07-01 PROCEDURE — 25010000002 LIDOCAINE PF 2% 2 % SOLUTION: Performed by: NURSE ANESTHETIST, CERTIFIED REGISTERED

## 2025-07-01 PROCEDURE — 25010000002 MAGNESIUM SULFATE PER 500 MG OF MAGNESIUM: Performed by: NURSE ANESTHETIST, CERTIFIED REGISTERED

## 2025-07-01 PROCEDURE — 25010000002 EPINEPHRINE PER 0.1 MG: Performed by: ORTHOPAEDIC SURGERY

## 2025-07-01 PROCEDURE — C1713 ANCHOR/SCREW BN/BN,TIS/BN: HCPCS | Performed by: ORTHOPAEDIC SURGERY

## 2025-07-01 PROCEDURE — 25010000002 ESMOLOL 100 MG/10ML SOLUTION: Performed by: NURSE ANESTHETIST, CERTIFIED REGISTERED

## 2025-07-01 PROCEDURE — L3670 SO ACRO/CLAV CAN WEB PRE OTS: HCPCS | Performed by: ORTHOPAEDIC SURGERY

## 2025-07-01 PROCEDURE — 25010000002 DROPERIDOL PER 5 MG: Performed by: NURSE ANESTHETIST, CERTIFIED REGISTERED

## 2025-07-01 PROCEDURE — 25810000003 LACTATED RINGERS PER 1000 ML: Performed by: ORTHOPAEDIC SURGERY

## 2025-07-01 PROCEDURE — 29828 SHO ARTHRS SRG BICP TENODSIS: CPT | Performed by: ORTHOPAEDIC SURGERY

## 2025-07-01 PROCEDURE — 25010000002 FAMOTIDINE 10 MG/ML SOLUTION: Performed by: STUDENT IN AN ORGANIZED HEALTH CARE EDUCATION/TRAINING PROGRAM

## 2025-07-01 PROCEDURE — 25010000002 BUPIVACAINE (PF) 0.5 % SOLUTION: Performed by: STUDENT IN AN ORGANIZED HEALTH CARE EDUCATION/TRAINING PROGRAM

## 2025-07-01 PROCEDURE — 25010000002 CEFAZOLIN PER 500 MG: Performed by: ORTHOPAEDIC SURGERY

## 2025-07-01 PROCEDURE — 25010000002 ONDANSETRON PER 1 MG: Performed by: NURSE ANESTHETIST, CERTIFIED REGISTERED

## 2025-07-01 PROCEDURE — 25010000002 FENTANYL CITRATE (PF) 50 MCG/ML SOLUTION: Performed by: STUDENT IN AN ORGANIZED HEALTH CARE EDUCATION/TRAINING PROGRAM

## 2025-07-01 DEVICE — SUTURETAPE FIBERLOOP W/ NDL WH/BL
Type: IMPLANTABLE DEVICE | Site: SHOULDER | Status: FUNCTIONAL
Brand: ARTHREX®

## 2025-07-01 DEVICE — SWVLK TENO BIO-COMP 7X 19.1MM
Type: IMPLANTABLE DEVICE | Site: SHOULDER | Status: FUNCTIONAL
Brand: ARTHREX®

## 2025-07-01 RX ORDER — PROMETHAZINE HYDROCHLORIDE 25 MG/1
25 SUPPOSITORY RECTAL ONCE AS NEEDED
Status: DISCONTINUED | OUTPATIENT
Start: 2025-07-01 | End: 2025-07-01 | Stop reason: HOSPADM

## 2025-07-01 RX ORDER — DIPHENHYDRAMINE HYDROCHLORIDE 50 MG/ML
12.5 INJECTION, SOLUTION INTRAMUSCULAR; INTRAVENOUS
Status: DISCONTINUED | OUTPATIENT
Start: 2025-07-01 | End: 2025-07-01 | Stop reason: HOSPADM

## 2025-07-01 RX ORDER — BUPIVACAINE HYDROCHLORIDE 5 MG/ML
INJECTION, SOLUTION EPIDURAL; INTRACAUDAL; PERINEURAL
Status: COMPLETED | OUTPATIENT
Start: 2025-07-01 | End: 2025-07-01

## 2025-07-01 RX ORDER — ONDANSETRON 2 MG/ML
INJECTION INTRAMUSCULAR; INTRAVENOUS AS NEEDED
Status: DISCONTINUED | OUTPATIENT
Start: 2025-07-01 | End: 2025-07-01 | Stop reason: SURG

## 2025-07-01 RX ORDER — DROPERIDOL 2.5 MG/ML
INJECTION, SOLUTION INTRAMUSCULAR; INTRAVENOUS AS NEEDED
Status: DISCONTINUED | OUTPATIENT
Start: 2025-07-01 | End: 2025-07-01 | Stop reason: SURG

## 2025-07-01 RX ORDER — ONDANSETRON 2 MG/ML
4 INJECTION INTRAMUSCULAR; INTRAVENOUS ONCE AS NEEDED
Status: DISCONTINUED | OUTPATIENT
Start: 2025-07-01 | End: 2025-07-01 | Stop reason: HOSPADM

## 2025-07-01 RX ORDER — MIDAZOLAM HYDROCHLORIDE 1 MG/ML
1 INJECTION, SOLUTION INTRAMUSCULAR; INTRAVENOUS
Status: DISCONTINUED | OUTPATIENT
Start: 2025-07-01 | End: 2025-07-01 | Stop reason: HOSPADM

## 2025-07-01 RX ORDER — ESMOLOL HYDROCHLORIDE 10 MG/ML
INJECTION INTRAVENOUS AS NEEDED
Status: DISCONTINUED | OUTPATIENT
Start: 2025-07-01 | End: 2025-07-01 | Stop reason: SURG

## 2025-07-01 RX ORDER — DROPERIDOL 2.5 MG/ML
0.62 INJECTION, SOLUTION INTRAMUSCULAR; INTRAVENOUS
Status: DISCONTINUED | OUTPATIENT
Start: 2025-07-01 | End: 2025-07-01 | Stop reason: HOSPADM

## 2025-07-01 RX ORDER — FENTANYL CITRATE 50 UG/ML
50 INJECTION, SOLUTION INTRAMUSCULAR; INTRAVENOUS
Status: DISCONTINUED | OUTPATIENT
Start: 2025-07-01 | End: 2025-07-01 | Stop reason: HOSPADM

## 2025-07-01 RX ORDER — ATROPINE SULFATE 0.4 MG/ML
0.4 INJECTION, SOLUTION INTRAMUSCULAR; INTRAVENOUS; SUBCUTANEOUS ONCE AS NEEDED
Status: DISCONTINUED | OUTPATIENT
Start: 2025-07-01 | End: 2025-07-01 | Stop reason: HOSPADM

## 2025-07-01 RX ORDER — EPHEDRINE SULFATE 50 MG/ML
5 INJECTION, SOLUTION INTRAVENOUS ONCE AS NEEDED
Status: DISCONTINUED | OUTPATIENT
Start: 2025-07-01 | End: 2025-07-01 | Stop reason: HOSPADM

## 2025-07-01 RX ORDER — SODIUM CHLORIDE 0.9 % (FLUSH) 0.9 %
3-10 SYRINGE (ML) INJECTION AS NEEDED
Status: DISCONTINUED | OUTPATIENT
Start: 2025-07-01 | End: 2025-07-01 | Stop reason: HOSPADM

## 2025-07-01 RX ORDER — LIDOCAINE HYDROCHLORIDE 10 MG/ML
0.5 INJECTION, SOLUTION INFILTRATION; PERINEURAL ONCE AS NEEDED
Status: DISCONTINUED | OUTPATIENT
Start: 2025-07-01 | End: 2025-07-01 | Stop reason: HOSPADM

## 2025-07-01 RX ORDER — HYDRALAZINE HYDROCHLORIDE 20 MG/ML
5 INJECTION INTRAMUSCULAR; INTRAVENOUS
Status: DISCONTINUED | OUTPATIENT
Start: 2025-07-01 | End: 2025-07-01 | Stop reason: HOSPADM

## 2025-07-01 RX ORDER — PROMETHAZINE HYDROCHLORIDE 25 MG/1
25 TABLET ORAL ONCE AS NEEDED
Status: DISCONTINUED | OUTPATIENT
Start: 2025-07-01 | End: 2025-07-01 | Stop reason: HOSPADM

## 2025-07-01 RX ORDER — DOCUSATE SODIUM 100 MG/1
100 CAPSULE, LIQUID FILLED ORAL 2 TIMES DAILY
Qty: 60 CAPSULE | Refills: 0 | Status: SHIPPED | OUTPATIENT
Start: 2025-07-01

## 2025-07-01 RX ORDER — HYDROCODONE BITARTRATE AND ACETAMINOPHEN 7.5; 325 MG/1; MG/1
1 TABLET ORAL EVERY 6 HOURS PRN
Qty: 30 TABLET | Refills: 0 | Status: SHIPPED | OUTPATIENT
Start: 2025-07-01

## 2025-07-01 RX ORDER — FENTANYL CITRATE 50 UG/ML
50 INJECTION, SOLUTION INTRAMUSCULAR; INTRAVENOUS ONCE AS NEEDED
Status: COMPLETED | OUTPATIENT
Start: 2025-07-01 | End: 2025-07-01

## 2025-07-01 RX ORDER — ROCURONIUM BROMIDE 10 MG/ML
INJECTION, SOLUTION INTRAVENOUS AS NEEDED
Status: DISCONTINUED | OUTPATIENT
Start: 2025-07-01 | End: 2025-07-01 | Stop reason: SURG

## 2025-07-01 RX ORDER — LABETALOL HYDROCHLORIDE 5 MG/ML
5 INJECTION, SOLUTION INTRAVENOUS
Status: DISCONTINUED | OUTPATIENT
Start: 2025-07-01 | End: 2025-07-01 | Stop reason: HOSPADM

## 2025-07-01 RX ORDER — FLUMAZENIL 0.1 MG/ML
0.2 INJECTION INTRAVENOUS AS NEEDED
Status: DISCONTINUED | OUTPATIENT
Start: 2025-07-01 | End: 2025-07-01 | Stop reason: HOSPADM

## 2025-07-01 RX ORDER — PROPOFOL 10 MG/ML
INJECTION, EMULSION INTRAVENOUS AS NEEDED
Status: DISCONTINUED | OUTPATIENT
Start: 2025-07-01 | End: 2025-07-01 | Stop reason: SURG

## 2025-07-01 RX ORDER — OXYCODONE AND ACETAMINOPHEN 7.5; 325 MG/1; MG/1
1 TABLET ORAL EVERY 4 HOURS PRN
Status: DISCONTINUED | OUTPATIENT
Start: 2025-07-01 | End: 2025-07-01 | Stop reason: HOSPADM

## 2025-07-01 RX ORDER — HYDROMORPHONE HYDROCHLORIDE 1 MG/ML
0.5 INJECTION, SOLUTION INTRAMUSCULAR; INTRAVENOUS; SUBCUTANEOUS
Status: DISCONTINUED | OUTPATIENT
Start: 2025-07-01 | End: 2025-07-01 | Stop reason: HOSPADM

## 2025-07-01 RX ORDER — ONDANSETRON 4 MG/1
4 TABLET, FILM COATED ORAL EVERY 8 HOURS PRN
Qty: 12 TABLET | Refills: 0 | Status: SHIPPED | OUTPATIENT
Start: 2025-07-01

## 2025-07-01 RX ORDER — DEXAMETHASONE SODIUM PHOSPHATE 4 MG/ML
INJECTION, SOLUTION INTRA-ARTICULAR; INTRALESIONAL; INTRAMUSCULAR; INTRAVENOUS; SOFT TISSUE AS NEEDED
Status: DISCONTINUED | OUTPATIENT
Start: 2025-07-01 | End: 2025-07-01 | Stop reason: SURG

## 2025-07-01 RX ORDER — SODIUM CHLORIDE, SODIUM LACTATE, POTASSIUM CHLORIDE, AND CALCIUM CHLORIDE .6; .31; .03; .02 G/100ML; G/100ML; G/100ML; G/100ML
IRRIGANT IRRIGATION AS NEEDED
Status: DISCONTINUED | OUTPATIENT
Start: 2025-07-01 | End: 2025-07-01 | Stop reason: HOSPADM

## 2025-07-01 RX ORDER — IPRATROPIUM BROMIDE AND ALBUTEROL SULFATE 2.5; .5 MG/3ML; MG/3ML
3 SOLUTION RESPIRATORY (INHALATION) ONCE AS NEEDED
Status: DISCONTINUED | OUTPATIENT
Start: 2025-07-01 | End: 2025-07-01 | Stop reason: HOSPADM

## 2025-07-01 RX ORDER — LIDOCAINE HYDROCHLORIDE 20 MG/ML
INJECTION, SOLUTION EPIDURAL; INFILTRATION; INTRACAUDAL; PERINEURAL AS NEEDED
Status: DISCONTINUED | OUTPATIENT
Start: 2025-07-01 | End: 2025-07-01 | Stop reason: SURG

## 2025-07-01 RX ORDER — SODIUM CHLORIDE 0.9 % (FLUSH) 0.9 %
3 SYRINGE (ML) INJECTION EVERY 12 HOURS SCHEDULED
Status: DISCONTINUED | OUTPATIENT
Start: 2025-07-01 | End: 2025-07-01 | Stop reason: HOSPADM

## 2025-07-01 RX ORDER — NALOXONE HCL 0.4 MG/ML
0.2 VIAL (ML) INJECTION AS NEEDED
Status: DISCONTINUED | OUTPATIENT
Start: 2025-07-01 | End: 2025-07-01 | Stop reason: HOSPADM

## 2025-07-01 RX ORDER — SODIUM CHLORIDE, SODIUM LACTATE, POTASSIUM CHLORIDE, CALCIUM CHLORIDE 600; 310; 30; 20 MG/100ML; MG/100ML; MG/100ML; MG/100ML
9 INJECTION, SOLUTION INTRAVENOUS CONTINUOUS
Status: DISCONTINUED | OUTPATIENT
Start: 2025-07-01 | End: 2025-07-01 | Stop reason: HOSPADM

## 2025-07-01 RX ORDER — HYDROCODONE BITARTRATE AND ACETAMINOPHEN 5; 325 MG/1; MG/1
1 TABLET ORAL ONCE AS NEEDED
Status: DISCONTINUED | OUTPATIENT
Start: 2025-07-01 | End: 2025-07-01 | Stop reason: HOSPADM

## 2025-07-01 RX ORDER — MAGNESIUM SULFATE HEPTAHYDRATE 500 MG/ML
INJECTION, SOLUTION INTRAMUSCULAR; INTRAVENOUS AS NEEDED
Status: DISCONTINUED | OUTPATIENT
Start: 2025-07-01 | End: 2025-07-01 | Stop reason: SURG

## 2025-07-01 RX ORDER — FAMOTIDINE 10 MG/ML
20 INJECTION, SOLUTION INTRAVENOUS ONCE
Status: COMPLETED | OUTPATIENT
Start: 2025-07-01 | End: 2025-07-01

## 2025-07-01 RX ADMIN — BUPIVACAINE HYDROCHLORIDE 10 ML: 5 INJECTION, SOLUTION EPIDURAL; INTRACAUDAL; PERINEURAL at 11:28

## 2025-07-01 RX ADMIN — PROPOFOL 150 MG: 10 INJECTION, EMULSION INTRAVENOUS at 12:08

## 2025-07-01 RX ADMIN — CEFAZOLIN 2 G: 2 INJECTION, POWDER, FOR SOLUTION INTRAMUSCULAR; INTRAVENOUS at 11:55

## 2025-07-01 RX ADMIN — FENTANYL CITRATE 50 MCG: 50 INJECTION, SOLUTION INTRAMUSCULAR; INTRAVENOUS at 11:23

## 2025-07-01 RX ADMIN — DROPERIDOL 0.62 MG: 2.5 INJECTION, SOLUTION INTRAMUSCULAR; INTRAVENOUS at 12:14

## 2025-07-01 RX ADMIN — LIDOCAINE HYDROCHLORIDE 100 MG: 20 INJECTION, SOLUTION EPIDURAL; INFILTRATION; INTRACAUDAL; PERINEURAL at 12:08

## 2025-07-01 RX ADMIN — ESMOLOL HYDROCHLORIDE 35 MG: 10 INJECTION, SOLUTION INTRAVENOUS at 12:08

## 2025-07-01 RX ADMIN — BUPIVACAINE HYDROCHLORIDE 10 ML: 5 INJECTION, SOLUTION EPIDURAL; INTRACAUDAL; PERINEURAL at 11:32

## 2025-07-01 RX ADMIN — ONDANSETRON 4 MG: 2 INJECTION, SOLUTION INTRAMUSCULAR; INTRAVENOUS at 12:57

## 2025-07-01 RX ADMIN — DROPERIDOL 0.62 MG: 2.5 INJECTION, SOLUTION INTRAMUSCULAR; INTRAVENOUS at 13:20

## 2025-07-01 RX ADMIN — MIDAZOLAM 1 MG: 1 INJECTION INTRAMUSCULAR; INTRAVENOUS at 11:23

## 2025-07-01 RX ADMIN — SUGAMMADEX 400 MG: 100 INJECTION, SOLUTION INTRAVENOUS at 13:03

## 2025-07-01 RX ADMIN — SODIUM CHLORIDE, POTASSIUM CHLORIDE, SODIUM LACTATE AND CALCIUM CHLORIDE 9 ML/HR: 600; 310; 30; 20 INJECTION, SOLUTION INTRAVENOUS at 11:00

## 2025-07-01 RX ADMIN — ROCURONIUM BROMIDE 70 MG: 10 INJECTION, SOLUTION INTRAVENOUS at 12:08

## 2025-07-01 RX ADMIN — BUPIVACAINE 10 ML: 13.3 INJECTION, SUSPENSION, LIPOSOMAL INFILTRATION at 11:28

## 2025-07-01 RX ADMIN — MAGNESIUM SULFATE HEPTAHYDRATE 2 G: 500 INJECTION, SOLUTION INTRAMUSCULAR; INTRAVENOUS at 12:14

## 2025-07-01 RX ADMIN — DEXAMETHASONE SODIUM PHOSPHATE 8 MG: 4 INJECTION, SOLUTION INTRAMUSCULAR; INTRAVENOUS at 12:14

## 2025-07-01 RX ADMIN — SODIUM CHLORIDE, POTASSIUM CHLORIDE, SODIUM LACTATE AND CALCIUM CHLORIDE: 600; 310; 30; 20 INJECTION, SOLUTION INTRAVENOUS at 12:36

## 2025-07-01 RX ADMIN — FAMOTIDINE 20 MG: 10 INJECTION INTRAVENOUS at 11:09

## 2025-07-01 NOTE — ANESTHESIA PROCEDURE NOTES
Peripheral Block      Patient reassessed immediately prior to procedure    Patient location during procedure: pre-op  Start time: 7/1/2025 11:28 AM  Stop time: 7/1/2025 11:32 AM  Reason for block: at surgeon's request and post-op pain management  Performed by  Anesthesiologist: Denia Kennedy MD  Preanesthetic Checklist  Completed: patient identified, IV checked, site marked, risks and benefits discussed, surgical consent, monitors and equipment checked, pre-op evaluation and timeout performed  Prep:  Pt Position: sitting  Sterile barriers:cap, gloves, mask and washed/disinfected hands  Prep: ChloraPrep  Patient monitoring: blood pressure monitoring, continuous pulse oximetry and EKG  Procedure    Sedation: yes    Guidance:ultrasound guided    Needle gauge: 25 g. Images:still images obtained, printed/placed on chart    Laterality:right  Anesthesia block type: Intercostal brachial nerve block.  Injection Technique:single-shot  Needle Type:echogenic  Resistance on Injection: none    Medications Used: bupivacaine (PF) (MARCAINE) 0.5 % injection - Injection   10 mL - 7/1/2025 11:32:00 AM      Post Assessment  Injection Assessment: negative aspiration for heme, no paresthesia on injection and incremental injection  Patient Tolerance:comfortable throughout block  Complications:no  Additional Notes  Diagnosis: Acute pain of the right medial upper arm (axilla) in the distribution not covered by the brachial plexus    Ultrasound guidance used for safe needle positioning and visualization of incremental local anesthetic injection  Performed by: Denia Kennedy MD

## 2025-07-01 NOTE — BRIEF OP NOTE
SHOULDER ARTHROSCOPY SUPERIOR LABRAL ANTERIOR POSTERIOR TEAR REPAIR  Progress Note    Farheen Chadwick  7/1/2025    Pre-op Diagnosis:   Glenoid labral tear, right, initial encounter [S43.431A]       Post-Op Diagnosis Codes:     * Glenoid labral tear, right, initial encounter [S43.431A]    Procedure(s):      Procedure(s):  RIGHT SHOULDER ARTHROSCOPY, labral debridement, biceps  tenodesis              Surgeon(s):  Zan Rios MD    Anesthesia: General with Block    Staff:   Circulator: Daysi Yan RN  Scrub Person: Robert Dickerson CSFA  Vendor Representative: Gilma Bo Michael  Assistant: Zandra Carroll CSA  Assistant: Zandra Carroll CSA    Estimated Blood Loss: minimal    Urine Voided: * No values recorded between 7/1/2025 12:00 PM and 7/1/2025 12:56 PM *    Specimens:                None      Drains: * No LDAs found *    Findings: See dictation      Complications: None    Assistant: Zandra Carroll CSA  was responsible for performing the following activities: Retraction and their skilled assistance was necessary for the success of this case.    Zan Rios MD     Date: 7/1/2025  Time: 13:01 EDT

## 2025-07-01 NOTE — DISCHARGE INSTRUCTIONS
[facility] Surgery Nurse Navigator    You will receive a phone call from a Nurse Navigator 24 to 72 hours after your surgery.   If you have a non-urgent question, you can reach the Nurse Navigator at [phone].       What to expect after a Nerve Block    Nerve blocks administered to block pain affect many types of nerves, including those nerves that control movement, pain, and normal sensation. Following a nerve block, you may notice some bruising at the site where the block was given. You may experience sensations such as: numbness of the affected area or limb, tingling, heaviness (that is the limb feels heavy to you), weakness or inability to move the affected arm or leg, or a feeling as if your arm or leg has “fallen asleep.”     A nerve block can last from 2 to 36 hours depending on the medications used.  Usually the weakness wears off first followed by the tingling and heaviness. As the block wears off, you may begin to notice pain; however, this sequence of events may occur in any order. Typically, you will be able to move your limb before you will feel it. Once a nerve block begins to wear off, the effects are usually completely gone within 60 minutes.  If you experience continued side effects that you believe are block related for longer than 48 hours, please call your healthcare provider. Please see block-specific instructions below.    Instructions for any block involving the shoulder or arm  If you have had any kind of shoulder/arm block, you will go home with your arm in a sling. Wear the sling until the block has completely worn off. You may be required to wear it for a longer period of time per your surgeon’s recommendations.  If you have had a shoulder/arm block, it is a good idea to sleep on a recliner with pillows under your arm.    You may experience symptoms such as:  Shortness of breath  Hoarseness   Blurry vision  Unequal pupils  Drooping of your face on the same side as the  block was performed    These are side effects associated with this kind of block and should go away within 12 hours.    Note: If you have severe or prolonged shortness of breath, please seek medical assistance as soon as possible.     Protection of a “blocked” arm or leg (limb)  After a nerve block, you cannot feel pain, pressure, or extremes of temperature in the affected limb. And because of this, your blocked limb is at more risk for injury. For example, it is possible to burn your limb on an extremely hot surface without feeling it.     When resting, it is important to reposition your limb periodically to avoid prolonged pressure on it. This may require the use of pillows and padding.    While sleeping, you should avoid rolling onto the affected limb or putting too much pressure on it.     If you have a cast or tight dressing, check the color of your fingers or toes of the affected limb. Call your surgeon if they look discolored (that is, dusky, dark colored).    Use caution in cold weather. Cover your limb appropriately to protect it from the cold.      Pain Management:    Your surgeon will give you a prescription for pain medication. Begin taking this before the nerve block wears off. Bear in mind that sometimes the block can wear off in the middle of the night.

## 2025-07-01 NOTE — ANESTHESIA POSTPROCEDURE EVALUATION
Patient: Farheen Chadwick    Procedure Summary       Date: 07/01/25 Room / Location:  [facility] [address] [place] [address] [facility]    Anesthesia Start: 1159 Anesthesia Stop: 1317    Procedure: RIGHT SHOULDER ARTHROSCOPY, labral debridement, biceps  tenodesis (Right: Shoulder) Diagnosis:       Glenoid labral tear, right, initial encounter      (Glenoid labral tear, right, initial encounter [S43.431A])    Surgeons: Zan Rios MD Provider: Denia Kennedy MD    Anesthesia Type: general ASA Status: 2            Anesthesia Type: general    Vitals  Vitals Value Taken Time   /85 07/01/25 14:00   Temp 36.4 °C (97.6 °F) 07/01/25 14:00   Pulse 70 07/01/25 14:02   Resp 18 07/01/25 14:00   SpO2 98 % 07/01/25 14:02   Vitals shown include unfiled device data.        Post Anesthesia Care and Evaluation    Patient location during evaluation: PHASE II  Patient participation: complete - patient participated  Level of consciousness: awake  Pain management: adequate    Airway patency: patent  Anesthetic complications: No anesthetic complications  PONV Status: none  Cardiovascular status: stable  Respiratory status: acceptable  Hydration status: acceptable

## 2025-07-01 NOTE — OP NOTE
Orthopaedic Operative Note    Facility: [facility]    Patient: Farheen Chadwick    Medical Record Number: [id number]    YOB: 1990    Dictating Surgeon: Zan Rios M.D.*    Primary Care Physician: Katarzyna Byrd APRN    Date of Operation: 7/1/2025    Pre-Operative Diagnosis: Right shoulder pain, suspected recurrent glenoid labral tear, biceps tendinopathy and possible associated rotator cuff tear    Post-Operative Diagnosis: Complex right glenoid labral tear, early glenohumeral osteoarthritis    Procedure Performed:    1.  Right shoulder arthroscopic biceps tenodesis    2.  Arthroscopic labral debridement    Surgeon: Zan Rios MD     Assistant: Zandra Carroll whose assistance was critical for help with patient positioning, suctioning and irrigation, retraction, manipulation of the extremity for insertion of the implants, wound closure and application of the bandages.  Her assistance was critical to the success of this case.      Anesthesia: Regional followed by general    Complications: None.     Estimated Blood Loss: Less than 50 mL.     Implants: Arthrex 7 mm biceps tenodesis SwiveLock anchor for arthroscopic biceps tenodesis    Specimens: * No orders in the log *    Brief Operative Indication:  Ms. Chadwick had a recent history of right shoulder pain.  We had a thorough discussion regarding the risks, benefits and alternatives to surgical intervention. I explained that surgical risks include infection, hematoma, anchor related complications including failure of fixation, loosening, cutout of the anchors, chondrolysis, rotator cuff re-tear necessitating revision surgery, persistent pain and/or loss of motion, iatrogenic nerve and/or blood vessel injury resulting in permanent weakness, numbness or dysfunction, RSD, DVT, PE, positioning related neuropraxia, and anesthesia related complications resulting in death.  We further discussed the possible need to address  the biceps with a possible tenotomy versus tenodesis.  We discussed the fact that if the biceps demonstrates significant pathology in the groove that I would plan to perform a tenotomy which could result in rigoberto deformity or persistent pain, weakness or cramping.  We also discussed possible risks with a tenodesis as well including screw related complications including cutout, chondrolysis, failure of fixation with re-tear of the biceps, fracture and possible iatrogenic nerve injury.  She voiced understanding of the risks, benefits, and alternative forms of treatment that were discussed and consented to proceed.    Description of the procedure in detail:  The patient and operative site were identified in the preoperative holding area and the surgical site was marked.  Adequate regional anesthesia of the right upper extremity was administered.  Following this, the patient was taken to the operating room and placed in the supine position.  Adequate general anesthesia was then administered and the patient was repositioned on the operating table.  All bony prominences were carefully padded and protected while the patient was positioned in the lateral decubitus position.  The arm was prepped and draped in the standard, sterile fashion.  I cleaned the extremity with an alcohol solution.  A Hibiclens scrub was performed and then the extremity was prepped with 2 ChloraPrep preps.  I allowed those to dry for 3 minutes before the draping procedure was carried out and the arm placed into 10 pounds of lateral traction.  A timeout was taken and preoperative antibiotics administered prior to surgical incision.    I began the procedure by fashioning a standard posterior portal.  I was able to utilize her previous scar for this portal.  The scope was inserted into the joint and directed anteriorly to the rotator interval where a standard anterior portal was then established using needle localization technique.  A 7 mm cannula was  inserted into the joint and then a diagnostic arthroscopy performed.    Upon entering the joint, it was apparent that she had extensile tearing of the posterior and superior glenoid labrum.  There were large flaps of labral tissue which limited visualization.  These were debrided with a shaver back to a stable rim.  Her MRI had suggested possible recurrent tearing of the anterior labrum.  This was completely intact.  The previous repair looked great.  All of the sutures looked fine and the repair appeared to have healed very nicely.  There was no evident recurrent tearing of the anterior labrum.    The biceps anchor was detached from the supraglenoid tubercle and there was degenerative tearing of the superior labrum.  I used a probe to examine the intertubercular portion of the biceps and this demonstrated instability.  I determined that a biceps tenodesis was warranted.  The long head of the biceps was released from the supraglenoid tubercle in anticipation of a tenodesis of that structure.  The biceps stump and degenerative superior labral tear were then debrided with a shaver.  The remainder of the labrum was probed and confirmed to be intact and stable.    Her rotator cuff was intact including the visible portions of the subscapularis, supraspinatus, infraspinatus and teres minor.  No significant rotator cuff pathology was noted from the articular side.  She did have early arthritic change on both sides of the joint.  The posterior and inferior quartile of the glenoid had near full-thickness chondral loss.  This extended up along the posterior rim of the glenoid to about the 10 o'clock position on the clock face of the glenoid.  She had similar grade 4 change along the posterior portion of the humeral head.  Multiple images of these were taken and saved.  Unfortunately, I did not consider that there was any intervention that I could offer which would predictably improve this pathology.    I directed my attention  to the subacromial space.  A standard lateral portal was established and the scope was inserted into the subacromial space.  There was extensive subacromial/subdeltoid bursitis which was debrided with a shaver.  Her coracoacromial ligament looked normal and there was no significant subacromial spur.  I did not consider that a subacromial decompression was necessary.  The rotator cuff was examined from the bursal side at this point.  This was confirmed to be completely intact.  I therefore directed my attention to the biceps tenodesis.    A combination of a shaver and Arthrex [place] device were used to remove the bursal tissue extending down along the lateral edge of the proximal humerus.  I very carefully dissected out the biceps in the groove at the level of the falciform ligament.  The long head of the biceps was carefully delivered into the subacromial space.  An accessory portal was established over the suprapectoral region, just above the falciform ligament.  The long head of the biceps was delivered out of this accessory portal.  I measured the tendon diameter.  The tendon was then whip stitched with a #2 FiberWire.  The diseased portion of the biceps was debrided and removed.  An appropriate diameter bullet-tip reamer was used to ream a  hole in the suprapectoral region within the groove.  This was carried down to a depth of 20 mm.  The aperture of the hole was carefully debrided of bony and soft tissue debris.  The long head of the biceps was then delivered into this tunnel and secured with the Arthrex biceps tenodesis SwiveLock.  The screw got excellent fixation in the bone and seemed to secure the tendon very well.  The sutures from the SwiveLock were then tied to those from the whipstitch of the tendon to further supplement the fixation.  The tendon seemed to be well fixed and secure.  Final images were taken.    The instruments were withdrawn.  The wounds were copiously irrigated out with sterile  saline.  The wounds were closed in a layered fashion.  Sterile dressings were applied.  The drapes were withdrawn.  The arm was placed in an immobilizer.  The patient was awakened and taken to the recovery room in good condition.    Zan Rios MD  07/01/25

## 2025-07-01 NOTE — ANESTHESIA PREPROCEDURE EVALUATION
Anesthesia Evaluation     Patient summary reviewed and Nursing notes reviewed   no history of anesthetic complications:   NPO Solid Status: > 8 hours  NPO Liquid Status: > 2 hours           Airway   Mallampati: II  TM distance: >3 FB  No difficulty expected  Dental - normal exam     Pulmonary - normal exam   (+) asthma,  (-) COPD  Cardiovascular   Exercise tolerance: good (4-7 METS)    Rhythm: regular    (+) valvular problems/murmurs murmur  (-) past MI, angina, cardiac stents      Neuro/Psych  (+) headaches  (-) seizures, CVA  GI/Hepatic/Renal/Endo    (-) GERD, liver disease, no renal disease    Musculoskeletal         ROS comment: Kenrick-Danlos  Abdominal    Substance History - negative use     OB/GYN          Other - negative ROS                         Anesthesia Plan    ASA 2     general     (Plan for PNB for post-op pain management    VITALS:  /81   Pulse 76   Temp 36.8 °C (98.2 °F) (Oral)   Resp 16   LMP 06/26/2025   SpO2 98% )  intravenous induction     Anesthetic plan, risks, benefits, and alternatives have been provided, discussed and informed consent has been obtained with: patient.  Pre-procedure education provided      CODE STATUS:

## 2025-07-01 NOTE — ANESTHESIA PROCEDURE NOTES
Airway  Reason: elective    Date/Time: 7/1/2025 12:12 PM  Airway not difficult    General Information and Staff    Patient location during procedure: OR  Anesthesiologist: Denia Kennedy MD  CRNA/CAA: Iram Pearson CRNA    Indications and Patient Condition  Indications for airway management: airway protection    Preoxygenated: yes    Mask difficulty assessment: 2 - vent by mask + OA or adjuvant +/- NMBA    Final Airway Details    Final airway type: endotracheal airway      Successful airway: ETT  Cuffed: yes   Successful intubation technique: direct laryngoscopy  Adjuncts used in placement: intubating stylet  Endotracheal tube insertion site: oral  Blade: Mcpherson  Blade size: 2  ETT size (mm): 7.0  Cormack-Lehane Classification: grade I - full view of glottis  Placement verified by: chest auscultation and capnometry   Cuff volume (mL): 6  Measured from: teeth  ETT/EBT  to teeth (cm): 21  Number of attempts at approach: 1  Assessment: lips, teeth, and gum same as pre-op and atraumatic intubation

## 2025-07-01 NOTE — H&P
History & Physical       Patient: Farheen Chadwick    YOB: 1990    Medical Record Number: [id number]    Chief Complaints: Preop    History of Present Illness: 35 y.o. female presents today in anticipation of upcoming surgery.  Denies any changes to medical history.  Denies any changes to her symptomatology.    Allergies:   Allergies   Allergen Reactions    Latex Anaphylaxis    Sulfa Antibiotics Unknown (See Comments)     Happened when she was a baby    Codeine Hallucinations    Levaquin [Levofloxacin] Swelling       Home Medications:    Current Facility-Administered Medications:     bupivacaine liposome (EXPAREL) 1.3 % injection 266 mg, 20 mL, Injection, Once, Zan Rios MD    ceFAZolin 2000 mg IVPB in 100 mL NS (MBP), 2 g, Intravenous, Once, Zan Rios MD    famotidine (PEPCID) injection 20 mg, 20 mg, Intravenous, Once, Denia Kennedy MD    fentaNYL citrate (PF) (SUBLIMAZE) injection 50 mcg, 50 mcg, Intravenous, Once PRN, Denia Kennedy MD    lactated ringers infusion, 9 mL/hr, Intravenous, Continuous, Denia Kennedy MD, Last Rate: 9 mL/hr at 07/01/25 1100, 9 mL/hr at 07/01/25 1100    lidocaine (XYLOCAINE) 1 % injection 0.5 mL, 0.5 mL, Intradermal, Once PRN, Denia Kennedy MD    midazolam (VERSED) injection 1 mg, 1 mg, Intravenous, Q5 Min PRN, Denia Kennedy MD    sodium chloride 0.9 % flush 3 mL, 3 mL, Intravenous, Q12H, Denia Kennedy MD    sodium chloride 0.9 % flush 3-10 mL, 3-10 mL, Intravenous, PRN, Denia Kennedy MD    Past Medical History:   Diagnosis Date    Allergic     Latex    Anemia     Ankle sprain 2008    Asthma     no inhaler    Chronic constipation     Dislocation of finger     Dislocation, shoulder ?    Could dislocate it myself all my life. Had surgery on it in 2019    Kenrick-Danlos disease     Heart murmur     benign regurgitation    Irritable bowel syndrome     Labral tear of shoulder     RIGHT     Migraine     Neck strain     Scoliosis ?    I’ve been told i had a slight curvature but never treated    Seasonal allergies     Subluxation of patella     Tear of meniscus of knee     Had meniscus repair in 2016    Tendinitis of knee           Past Surgical History:   Procedure Laterality Date     SECTION N/A 2019    Procedure:  SECTION PRIMARY;  Surgeon: Jessiac Jones MD;  Location: [facility] LABOR DELIVERY;  Service: Obstetrics/Gynecology    EAR TUBES      KNEE ARTHROSCOPY Right     KNEE SURGERY  2015    Meniscus    SHOULDER ARTHROSCOPY W/ SUPERIOR LABRAL ANTERIOR POSTERIOR REPAIR Right 2019    Procedure: RIGHT SHOULDER ARTHROSCOPY WITH anterior labral repair;  Surgeon: Zan Rios MD;  [place] [facility];  Service: Orthopedics    SHOULDER SURGERY  2019    Labrum          Social History     Occupational History    Not on file   Tobacco Use    Smoking status: Never    Smokeless tobacco: Never    Tobacco comments:     Never   Vaping Use    Vaping status: Never Used   Substance and Sexual Activity    Alcohol use: Not Currently     Comment: Drink very rarely    Drug use: Never    Sexual activity: Yes     Partners: Male     Birth control/protection: Condom      Social History     Social History Narrative    Not on file          Family History   Problem Relation Age of Onset    Hypertension Mother     Diabetes Mother     Lung disease Mother     Heart disease Mother     Cancer Mother         My mom had uterine cancer that spread.    Rheumatologic disease Father         My dad has rheumatoid arthritis    Cancer Paternal Uncle     Clotting disorder Maternal Grandmother     Cancer Maternal Grandfather     Hypertension Maternal Grandfather     Diabetes Paternal Grandmother     Lung disease Paternal Grandmother     Heart disease Paternal Grandmother     Cancer Paternal Grandfather     Hypertension Paternal Grandfather     Malig Hyperthermia Neg Hx        Review of Systems:  A 14  "point review of systems is reviewed with the patient.  Pertinent positives are listed above.  All others are negative.    Physical Exam: 35 y.o. female    Vitals:    07/01/25 1025   BP: 127/97   BP Location: Right arm   Patient Position: Sitting   Pulse: 82   Resp: 16   Temp: 98.2 °F (36.8 °C)   TempSrc: Oral   SpO2: 100%       General:  Patient is awake and alert.  Appears in no acute distress or discomfort.    Psych:  Affect and demeanor are appropriate.    Eyes:  Conjunctiva and sclera appear grossly normal.  Eyes track well and EOM seem to be intact.    Ears:  No gross abnormalities.  Hearing adequate for the exam.    Cardiovascular:  Regular rate and rhythm.    Lungs:  Good chest expansion.  Breathing unlabored.    Lymph:  No palpable adenopathy about neck or axilla.    Extremity:  Skin benign and intact without evidence for swelling, masses or atrophy.  Exam otherwise deferred at this time.    Diagnostic Tests:  Lab Results   Component Value Date    GLUCOSE 101 (H) 06/24/2025    CALCIUM 9.5 06/24/2025     06/24/2025    K 3.9 06/24/2025    CO2 21.0 (L) 06/24/2025     06/24/2025    BUN 9.0 06/24/2025    CREATININE 0.97 06/24/2025    EGFRIFAFRI 100 04/02/2016    EGFRIFNONA 111 12/16/2019    BCR 9.3 06/24/2025    ANIONGAP 10.0 06/24/2025     Lab Results   Component Value Date    WBC 5.88 06/24/2025    HGB 13.4 06/24/2025    HCT 42.3 06/24/2025    MCV 94.4 06/24/2025     06/24/2025     No results found for: \"INR\", \"PROTIME\"    Assessment:  Right glenoid labral tear, biceps tendinopathy and rotator cuff tendinopathy    Plan: The patient denies any changes to their symptomatology.  Will proceed with surgery as planned.  I again offered her a chance to ask any questions about the upcoming procedure. She stated that she had a good understanding of everything and had no questions.    Zan Rios MD  07/01/25     "

## 2025-07-01 NOTE — ANESTHESIA PROCEDURE NOTES
Peripheral Block      Patient reassessed immediately prior to procedure    Patient location during procedure: pre-op  Start time: 7/1/2025 11:23 AM  Stop time: 7/1/2025 11:28 AM  Reason for block: at surgeon's request and post-op pain management  Performed by  Anesthesiologist: Denia Kennedy MD  Preanesthetic Checklist  Completed: patient identified, IV checked, site marked, risks and benefits discussed, surgical consent, monitors and equipment checked, pre-op evaluation and timeout performed  Prep:  Pt Position: sitting  Sterile barriers:cap, gloves, mask and washed/disinfected hands  Prep: ChloraPrep  Patient monitoring: blood pressure monitoring, continuous pulse oximetry and EKG  Procedure    Sedation: yes  Performed under: local infiltration  Guidance:ultrasound guided    ULTRASOUND INTERPRETATION.  Using ultrasound guidance a 21 G gauge needle was placed in close proximity to the brachial plexus nerve, at which point, under ultrasound guidance anesthetic was injected in the area of the nerve and spread of the anesthesia was seen on ultrasound in close proximity thereto.  There were no abnormalities seen on ultrasound; a digital image was taken; and the patient tolerated the procedure with no complications. Images:still images obtained, printed/placed on chart    Laterality:right  Block Type:interscalene  Injection Technique:single-shot  Needle Type:echogenic  Resistance on Injection: none    Medications Used: bupivacaine liposome (EXPAREL) 1.3 % injection - Infiltration   10 mL - 7/1/2025 11:28:00 AM  bupivacaine (PF) (MARCAINE) 0.5 % injection - Injection   10 mL - 7/1/2025 11:28:00 AM      Post Assessment  Injection Assessment: negative aspiration for heme, no paresthesia on injection and incremental injection  Patient Tolerance:comfortable throughout block  Complications:no  Additional Notes  Ultrasound guidance used for verification of needle location and appropriate medication  delivery.    Diagnosis: Acute post-surgical pain of the right shoulder   Performed by: Denia Kennedy MD

## 2025-07-09 ENCOUNTER — OFFICE VISIT (OUTPATIENT)
Dept: ORTHOPEDIC SURGERY | Facility: CLINIC | Age: 35
End: 2025-07-09
Payer: COMMERCIAL

## 2025-07-09 VITALS — WEIGHT: 155 LBS | HEIGHT: 64 IN | BODY MASS INDEX: 26.46 KG/M2 | TEMPERATURE: 98.2 F

## 2025-07-09 DIAGNOSIS — Z09 SURGERY FOLLOW-UP: Primary | ICD-10-CM

## 2025-07-09 RX ORDER — TRAMADOL HYDROCHLORIDE 50 MG/1
50 TABLET ORAL EVERY 4 HOURS PRN
Qty: 30 TABLET | Refills: 0 | Status: SHIPPED | OUTPATIENT
Start: 2025-07-09

## 2025-07-09 NOTE — PROGRESS NOTES
Farheen Chadwick : 1990 MRN: [id number] DATE: 2025    CC: 1 week s/p right shoulder arthroscopy    HPI: Patient returns to clinic today for follow up.  Reports pain is well controlled.  Denies fevers, drainage, redness or other concerning symptoms.      Vitals:    25 0939   Temp: 98.2 °F (36.8 °C)       Exam:  Wounds appear well-approximated.  Arm and forearm soft.  Shoulder moves fluidly with pendulums.  Good motor and sensory function in the hand and wrist.  Palpable radial pulse with brisk capillary refill     Impression:  1 week s/p right shoulder arthroscopic biceps tenodesis, SAD, labral debridement    Plan:    1.  Begin PT per protocol--encouraged patient to progress motion as tolerated and demonstrated home exercise program.  2.  Follow up in 3 weeks.  3.  Counseled the patient about appropriate activity modifications and restrictions.    Zan Rios MD

## 2025-07-16 ENCOUNTER — TREATMENT (OUTPATIENT)
Dept: PHYSICAL THERAPY | Facility: CLINIC | Age: 35
End: 2025-07-16
Payer: COMMERCIAL

## 2025-07-16 DIAGNOSIS — M25.511 CHRONIC RIGHT SHOULDER PAIN: Primary | ICD-10-CM

## 2025-07-16 DIAGNOSIS — Z47.89 ORTHOPEDIC AFTERCARE: ICD-10-CM

## 2025-07-16 DIAGNOSIS — G89.29 CHRONIC RIGHT SHOULDER PAIN: Primary | ICD-10-CM

## 2025-07-16 NOTE — PROGRESS NOTES
Physical Therapy Initial Evaluation and Plan of Care        Patient: Farheen Chadwick   : 1990  Diagnosis/ICD-10 Code:  Chronic right shoulder pain [M25.511, G89.29]  Referring practitioner: Zan Rios MD  Date of Initial Visit: 2025  Today's Date: 2025  Patient seen for 1 sessions           Subjective Questionnaire: QuickDASH: 79.55% disability      Subjective Evaluation    History of Present Illness  Mechanism of injury: Farheen is a 35 year old female who presents with chronic R shoulder pain. She underwent R biceps tenodesis, SAD and labrum debridement on  by Dr Rios. She hasn't needed pain meds recently, side effects were too bad. She takes Tylenol but was also getting side effects from that. She's compliant with sling wear. She's sleeping on the couch, not waking up overnight, but takes her a while to get to sleep. She's taking sling off periodically for pendulums. She had a previous labrum repair that didn't heal great, she's still really stiff in the R shoulder.      Patient Occupation: dance teacher Pain  Current pain rating: 3  At worst pain ratin  Aggravating factors: overhead activity, lifting, movement, outstretched reach, repetitive movement and keyboarding    Hand dominance: right             Objective          Palpation     Right   Hypertonic in the biceps. Tenderness of the biceps.     Tenderness     Right Shoulder  Tenderness in the AC joint.     Passive Range of Motion     Right Shoulder   Flexion: 90 degrees with pain  Abduction: 90 degrees with pain  External rotation 45°: 20 degrees with pain  Internal rotation 45°: 50 degrees     Additional Passive Range of Motion Details  ER measured as 20 degrees from neutral    Strength/Myotome Testing     Additional Strength Details  Defer per surgical protocol          Assessment & Plan       Assessment  Impairments: abnormal muscle tone, abnormal or restricted ROM, activity intolerance, impaired physical strength,  lacks appropriate home exercise program and pain with function   Functional limitations: carrying objects, lifting, pulling, pushing, reaching behind back, reaching overhead and unable to perform repetitive tasks   Assessment details: Pt presents with the following impairments: decreased shoulder ROM, limited shoulder strength, tenderness to palpation of shoulder structures, poor posture. Pt's signs and symptoms are consistent with referring diagnosis. Pt would benefit from additional skilled therapy to address the aforementioned problems and return to prior level of functioning with minimal functional limitations.  Prognosis: good    Goals  Plan Goals: Short Term Goals (achieve in 4 weeks):  1. Pt will decrease current pain to 2/10 or less.  2. Pt will be I with HEP.  3. Pt will increase R shoulder flexion PROM to 145 degrees to improve functional reaching tasks  4. Pt will exhibit improved posture by visual examination.  Long Term Goals (achieve in 6-8 weeks):  5. Pt will improve R shoulder internal rotation behind the back to level of T9 to improve grooming and dressing tasks.  6. Patient will improve R shoulder flexion, abduction and external rotation strength to 4+/5.  7. Pt will decrease QuickDASH score to 15% or less.  8. Pt will report worst pain decreased to 4/10 or less.    Plan  Therapy options: will be seen for skilled therapy services  Planned modality interventions: cryotherapy, TENS, thermotherapy (hydrocollator packs), ultrasound, dry needling, electrical stimulation/Russian stimulation, high voltage pulsed current (dermal wound therapy), high voltage pulsed current (pain management), high voltage pulsed current (spasm management) and iontophoresis  Planned therapy interventions: ADL retraining, flexibility, functional ROM exercises, home exercise program, IADL retraining, joint mobilization, therapeutic activities, stretching, strengthening, soft tissue mobilization, postural training and manual  therapy  Frequency: 2x week  Duration in weeks: 8  Treatment plan discussed with: patient        Visit Diagnoses:    ICD-10-CM ICD-9-CM   1. Chronic right shoulder pain  M25.511 719.41    G89.29 338.29   2. Orthopedic aftercare  Z47.89 V54.9       Timed:  Manual Therapy:    10     mins  42172;  Therapeutic Exercise:    10     mins  41922;     Neuromuscular Jody:        mins  02524;    Therapeutic Activity:     15     mins  99106;     Gait Training:           mins  91746;     Ultrasound:          mins  71698;    Electrical Stimulation:         mins  50023 ( );    Untimed:  Electrical Stimulation:         mins  40346 ( );  Mechanical Traction:         mins  64786;     Timed Treatment:   35   mins   Total Treatment:     70   mins    PT SIGNATURE: Jose Andrade PT, DPT, [facility]  DATE TREATMENT INITIATED: 7/16/2025      Initial Certification  Certification Period: 10/14/2025  I certify that the therapy services are furnished while this patient is under my care.  The services outlined above are required by this patient, and will be reviewed every 90 days.     PHYSICIAN: Zan Rios MD      DATE:     Please sign and return via fax to [phone].. Thank you, [facility].

## 2025-07-21 ENCOUNTER — TREATMENT (OUTPATIENT)
Dept: PHYSICAL THERAPY | Facility: CLINIC | Age: 35
End: 2025-07-21
Payer: COMMERCIAL

## 2025-07-21 DIAGNOSIS — M25.511 CHRONIC RIGHT SHOULDER PAIN: Primary | ICD-10-CM

## 2025-07-21 DIAGNOSIS — G89.29 CHRONIC RIGHT SHOULDER PAIN: Primary | ICD-10-CM

## 2025-07-21 DIAGNOSIS — Z47.89 ORTHOPEDIC AFTERCARE: ICD-10-CM

## 2025-07-21 PROCEDURE — 97140 MANUAL THERAPY 1/> REGIONS: CPT | Performed by: PHYSICAL THERAPIST

## 2025-07-21 PROCEDURE — 97112 NEUROMUSCULAR REEDUCATION: CPT | Performed by: PHYSICAL THERAPIST

## 2025-07-21 PROCEDURE — 97530 THERAPEUTIC ACTIVITIES: CPT | Performed by: PHYSICAL THERAPIST

## 2025-07-21 NOTE — PROGRESS NOTES
Physical Therapy Daily Treatment Note  [facility]   [address], [address]  [place] [address]  P: [phone]  F: [phone]    Patient: Farheen Chadwick   : 1990  Referring practitioner: Zan Rios MD  Date of Initial Visit: Type: THERAPY  Noted: 2025  Today's Date: 2025  Patient seen for 2 sessions       Visit Diagnoses:    ICD-10-CM ICD-9-CM   1. Chronic right shoulder pain  M25.511 719.41    G89.29 338.29   2. Orthopedic aftercare  Z47.89 V54.9         Subjective     Farheen Chadwick reports: Reports compliance with sling.         Objective   See Exercise, Manual, and Modality Logs for complete treatment.       Assessment:  Pt can tolerate R shoulder PROM in flexion and scaption planes to approximately 90 degrees, upon visual inspection, with pain at end range. Pt benefits from verbal cues for passive ROM with pendulum exercise. Pt will continue to benefit from gradual ROM and strengthening program, progressing as protocol and pt's tolerance allows.         Plan:  Progress per Plan of Care            Timed:         Manual Therapy:    8     mins  36706;     Therapeutic Exercise:         mins  68738;     Neuromuscular Jody:    10    mins  92841;    Therapeutic Activity:     15     mins  92533;     Gait Training:           mins  14653;     Ultrasound:          mins  16792;    Ionto                                   mins  73934  Self Care                            mins  66561    Un-Timed:  Electrical Stimulation:         mins  36203 (MC );  Traction          mins 74925        Timed Treatment:   33   mins   Total Treatment:     43   mins      Lio Hoskins, Physical Therapist Assistant  [place] License #: [id number]

## 2025-07-24 ENCOUNTER — TREATMENT (OUTPATIENT)
Dept: PHYSICAL THERAPY | Facility: CLINIC | Age: 35
End: 2025-07-24
Payer: COMMERCIAL

## 2025-07-24 DIAGNOSIS — G89.29 CHRONIC RIGHT SHOULDER PAIN: Primary | ICD-10-CM

## 2025-07-24 DIAGNOSIS — M25.511 CHRONIC RIGHT SHOULDER PAIN: Primary | ICD-10-CM

## 2025-07-24 DIAGNOSIS — Z47.89 ORTHOPEDIC AFTERCARE: ICD-10-CM

## 2025-07-24 NOTE — PROGRESS NOTES
Physical Therapy Daily Note    [facility]  [address]  [address]  [place] [address]      Patient: Farheen Chadwick   : 1990  Referring practitioner: Zan Rios MD  Date of Initial Visit:   Type: THERAPY  Noted: 2025  Today's Date: 2025  Patient seen for 3 sessions         Farheen Chadwick reports: pain has been manageable, she's still in her sling most of the time.        Subjective     Objective          Passive Range of Motion     Right Shoulder   Flexion: 132 degrees with pain  External rotation 45°: 13 degrees with pain      See Exercise, Manual, and Modality Logs for complete treatment.       Assessment/Plan    Farheen continues to be very painful and limited in her ER PROM, she needs considerable overpressure to reach 13 degrees today. She was advised to work on weaning the sling over the next week, with the goal being complete discharge of sling next week.    Patient requires verbal and tactile cueing from therapist for proper form with all interventions provided. Patient would continue to benefit from skilled therapy to address the limitations listed above.    Progress per POC    Visit Diagnoses:    ICD-10-CM ICD-9-CM   1. Chronic right shoulder pain  M25.511 719.41    G89.29 338.29   2. Orthopedic aftercare  Z47.89 V54.9                  Timed:  Manual Therapy:    15     mins  31825;  Therapeutic Exercise:    15     mins  49595;     Neuromuscular Jody:        mins  74601;    Therapeutic Activity:     10     mins  58781;     Gait Training:           mins  81375;  Ultrasound:          mins  62835;    Electrical Stimulation:         mins  66761 ( );    Untimed:  Group Therapy: ___  mins  89837;   Electrical Stimulation:    10     mins  24467 ( );  Mechanical Traction:         mins  60301;   Iontophoresis:             ___  mins 50681    Timed Treatment:   40   mins   Total Treatment:     50   mins    Jose Andrade PT, DPT,  OCS  Physical Therapist   [place] License #[id number]

## 2025-07-29 ENCOUNTER — TREATMENT (OUTPATIENT)
Dept: PHYSICAL THERAPY | Facility: CLINIC | Age: 35
End: 2025-07-29
Payer: COMMERCIAL

## 2025-07-29 DIAGNOSIS — G89.29 CHRONIC RIGHT SHOULDER PAIN: ICD-10-CM

## 2025-07-29 DIAGNOSIS — Z47.89 ORTHOPEDIC AFTERCARE: Primary | ICD-10-CM

## 2025-07-29 DIAGNOSIS — M25.511 CHRONIC RIGHT SHOULDER PAIN: ICD-10-CM

## 2025-07-29 PROCEDURE — 97112 NEUROMUSCULAR REEDUCATION: CPT | Performed by: PHYSICAL THERAPIST

## 2025-07-29 PROCEDURE — 97110 THERAPEUTIC EXERCISES: CPT | Performed by: PHYSICAL THERAPIST

## 2025-07-29 PROCEDURE — 97140 MANUAL THERAPY 1/> REGIONS: CPT | Performed by: PHYSICAL THERAPIST

## 2025-07-29 NOTE — PROGRESS NOTES
Physical Therapy Daily Note    [facility]  [address]  [address]  [place] [address]      Patient: Farheen Chadwick   : 1990  Referring practitioner: Zan Rios MD  Date of Initial Visit:   Type: THERAPY  Noted: 2025  Today's Date: 2025  Patient seen for 4 sessions         Farheen Chadwick reports: she's been more achy lately since we're increasing her exercises at home. She is planning on DC of the sling this week.        Subjective     Objective          Passive Range of Motion     Right Shoulder   Flexion: 143 degrees   External rotation 45°: 41 degrees with pain      See Exercise, Manual, and Modality Logs for complete treatment.       Assessment/Plan    Farheen continues to be very limited in her external rotation PROM, she exhibits pain and capsular end feel. She was instructed re: sling DC, incorporating natural arm swing with gait. Use scap retraction if she feels like shoulder needs support.    Patient requires verbal and tactile cueing from therapist for proper form with all interventions provided. Patient would continue to benefit from skilled therapy to address the limitations listed above.    Progress per POC    Visit Diagnoses:    ICD-10-CM ICD-9-CM   1. Orthopedic aftercare  Z47.89 V54.9   2. Chronic right shoulder pain  M25.511 719.41    G89.29 338.29                  Timed:  Manual Therapy:    10     mins  36809;  Therapeutic Exercise:    10     mins  55767;     Neuromuscular Jody:    10    mins  88408;    Therapeutic Activity:          mins  66936;     Gait Training:           mins  68112;  Ultrasound:          mins  89503;    Electrical Stimulation:         mins  00537 ( );    Untimed:  Group Therapy: ___  mins  44471;   Electrical Stimulation:         mins  90908 ( );  Mechanical Traction:         mins  13573;   Iontophoresis:             ___  mins 83846    Timed Treatment:   30   mins   Total Treatment:     30    graham Andrade PT, DPT, OCS  Physical Therapist   [place] License #[id number]

## 2025-07-30 ENCOUNTER — OFFICE VISIT (OUTPATIENT)
Dept: ORTHOPEDIC SURGERY | Facility: CLINIC | Age: 35
End: 2025-07-30
Payer: COMMERCIAL

## 2025-07-30 VITALS — WEIGHT: 157 LBS | HEIGHT: 64 IN | BODY MASS INDEX: 26.8 KG/M2 | TEMPERATURE: 98.3 F

## 2025-07-30 DIAGNOSIS — Z09 SURGERY FOLLOW-UP: Primary | ICD-10-CM

## 2025-07-30 NOTE — PROGRESS NOTES
Farheen Chadwick : 1990 MRN: [id number] DATE: 2025    CC: 1 month s/p right shoulder arthroscopy    HPI: Patient returns to clinic today for follow up.  Reports pain is much better.  Reports good progress with therapy.      Vitals:    25 0833   Temp: 98.3 °F (36.8 °C)       Exam:  Wounds appear healed.  Shoulder moves fluidly and her motion is on track.  Good motor and sensory function in the hand and wrist.  Palpable radial pulse with brisk capillary refill.    Impression:  1 month s/p right shoulder arthroscopic biceps tenodesis, labral debridement    Plan:    1.  Continue PT per protocol--encouraged patient to progress motion as tolerated and demonstrated home exercise program.  2.  OK to D/C sling.  OK to begin driving.  3.  Follow up in 6 weeks with Dr. Rios.  4.  Counseled the patient about appropriate activity modifications and restrictions.    GEORGIA Byrd    2025    Answers submitted by the patient for this visit:  Post Operative Visit (Submitted on 2025)  Chief Complaint: Follow-up  Pain Control: controlled  Fever: no fever  Diet: adequate intake  Activity: impaired due to weakness  Operative Site Issues: No

## 2025-08-01 ENCOUNTER — TREATMENT (OUTPATIENT)
Dept: PHYSICAL THERAPY | Facility: CLINIC | Age: 35
End: 2025-08-01
Payer: COMMERCIAL

## 2025-08-01 DIAGNOSIS — M25.511 CHRONIC RIGHT SHOULDER PAIN: ICD-10-CM

## 2025-08-01 DIAGNOSIS — Z47.89 ORTHOPEDIC AFTERCARE: Primary | ICD-10-CM

## 2025-08-01 DIAGNOSIS — G89.29 CHRONIC RIGHT SHOULDER PAIN: ICD-10-CM

## 2025-08-01 NOTE — PROGRESS NOTES
Physical Therapy Daily Note    [facility]  [address]  [address]  [place] [address]      Patient: Farheen Chadwick   : 1990  Referring practitioner: Zan Rios MD  Date of Initial Visit:   Type: THERAPY  Noted: 2025  Today's Date: 2025  Patient seen for 5 sessions         Farheen Chadwick reports: she's been out of the sling all week. She had a good follow up with her surgeon's office this past week.        Subjective     Objective          Passive Range of Motion     Right Shoulder   Flexion: 151 degrees with pain  Abduction: 155 degrees with pain  External rotation 45°: 51 degrees with pain      See Exercise, Manual, and Modality Logs for complete treatment.       Assessment/Plan    Farheen's R shoulder PROM is improving well, see objective measures for specific values. She is tolerating advancing AAROM exercises with no symptom exacerbation, she continues to be the most limited in ER PROM.    Patient requires verbal and tactile cueing from therapist for proper form with all interventions provided. Patient would continue to benefit from skilled therapy to address the limitations listed above.    Progress per POC    Visit Diagnoses:    ICD-10-CM ICD-9-CM   1. Orthopedic aftercare  Z47.89 V54.9   2. Chronic right shoulder pain  M25.511 719.41    G89.29 338.29                  Timed:  Manual Therapy:    10     mins  15198;  Therapeutic Exercise:    10     mins  94074;     Neuromuscular Jody:    10    mins  41385;    Therapeutic Activity:     10     mins  31559;     Gait Training:           mins  74745;  Ultrasound:          mins  57133;    Electrical Stimulation:         mins  71924 ( );    Untimed:  Group Therapy: ___  mins  17217;   Electrical Stimulation:    10     mins  14540 ( );  Mechanical Traction:         mins  16325;   Iontophoresis:             ___  mins 91872    Timed Treatment:   40   mins   Total Treatment:     50    graham Andrade PT, DPT, OCS  Physical Therapist   [place] License #[id number]

## 2025-08-05 ENCOUNTER — TREATMENT (OUTPATIENT)
Dept: PHYSICAL THERAPY | Facility: CLINIC | Age: 35
End: 2025-08-05
Payer: COMMERCIAL

## 2025-08-05 DIAGNOSIS — M25.511 CHRONIC RIGHT SHOULDER PAIN: ICD-10-CM

## 2025-08-05 DIAGNOSIS — G89.29 CHRONIC RIGHT SHOULDER PAIN: ICD-10-CM

## 2025-08-05 DIAGNOSIS — Z47.89 ORTHOPEDIC AFTERCARE: Primary | ICD-10-CM

## 2025-08-05 PROCEDURE — 97014 ELECTRIC STIMULATION THERAPY: CPT | Performed by: PHYSICAL THERAPIST

## 2025-08-05 PROCEDURE — 97110 THERAPEUTIC EXERCISES: CPT | Performed by: PHYSICAL THERAPIST

## 2025-08-05 PROCEDURE — 97530 THERAPEUTIC ACTIVITIES: CPT | Performed by: PHYSICAL THERAPIST

## 2025-08-05 PROCEDURE — 97112 NEUROMUSCULAR REEDUCATION: CPT | Performed by: PHYSICAL THERAPIST

## 2025-08-05 PROCEDURE — 97140 MANUAL THERAPY 1/> REGIONS: CPT | Performed by: PHYSICAL THERAPIST

## 2025-08-08 ENCOUNTER — TREATMENT (OUTPATIENT)
Dept: PHYSICAL THERAPY | Facility: CLINIC | Age: 35
End: 2025-08-08
Payer: COMMERCIAL

## 2025-08-08 DIAGNOSIS — M25.511 CHRONIC RIGHT SHOULDER PAIN: ICD-10-CM

## 2025-08-08 DIAGNOSIS — Z47.89 ORTHOPEDIC AFTERCARE: Primary | ICD-10-CM

## 2025-08-08 DIAGNOSIS — G89.29 CHRONIC RIGHT SHOULDER PAIN: ICD-10-CM

## 2025-08-12 ENCOUNTER — TREATMENT (OUTPATIENT)
Dept: PHYSICAL THERAPY | Facility: CLINIC | Age: 35
End: 2025-08-12
Payer: COMMERCIAL

## 2025-08-12 DIAGNOSIS — M25.511 CHRONIC RIGHT SHOULDER PAIN: ICD-10-CM

## 2025-08-12 DIAGNOSIS — Z47.89 ORTHOPEDIC AFTERCARE: Primary | ICD-10-CM

## 2025-08-12 DIAGNOSIS — G89.29 CHRONIC RIGHT SHOULDER PAIN: ICD-10-CM

## 2025-08-15 ENCOUNTER — TREATMENT (OUTPATIENT)
Dept: PHYSICAL THERAPY | Facility: CLINIC | Age: 35
End: 2025-08-15
Payer: COMMERCIAL

## 2025-08-15 DIAGNOSIS — Z47.89 ORTHOPEDIC AFTERCARE: Primary | ICD-10-CM

## 2025-08-15 DIAGNOSIS — M25.511 CHRONIC RIGHT SHOULDER PAIN: ICD-10-CM

## 2025-08-15 DIAGNOSIS — G89.29 CHRONIC RIGHT SHOULDER PAIN: ICD-10-CM

## 2025-08-19 ENCOUNTER — TREATMENT (OUTPATIENT)
Dept: PHYSICAL THERAPY | Facility: CLINIC | Age: 35
End: 2025-08-19
Payer: COMMERCIAL

## 2025-08-19 DIAGNOSIS — G89.29 CHRONIC RIGHT SHOULDER PAIN: ICD-10-CM

## 2025-08-19 DIAGNOSIS — Z47.89 ORTHOPEDIC AFTERCARE: Primary | ICD-10-CM

## 2025-08-19 DIAGNOSIS — M25.511 CHRONIC RIGHT SHOULDER PAIN: ICD-10-CM

## 2025-08-19 PROCEDURE — 97110 THERAPEUTIC EXERCISES: CPT | Performed by: PHYSICAL THERAPIST

## 2025-08-19 PROCEDURE — 97112 NEUROMUSCULAR REEDUCATION: CPT | Performed by: PHYSICAL THERAPIST

## 2025-08-19 PROCEDURE — 97140 MANUAL THERAPY 1/> REGIONS: CPT | Performed by: PHYSICAL THERAPIST

## 2025-08-19 PROCEDURE — 97530 THERAPEUTIC ACTIVITIES: CPT | Performed by: PHYSICAL THERAPIST

## 2025-08-19 PROCEDURE — 97014 ELECTRIC STIMULATION THERAPY: CPT | Performed by: PHYSICAL THERAPIST

## 2025-08-21 ENCOUNTER — TREATMENT (OUTPATIENT)
Dept: PHYSICAL THERAPY | Facility: CLINIC | Age: 35
End: 2025-08-21
Payer: COMMERCIAL

## 2025-08-21 DIAGNOSIS — Z47.89 ORTHOPEDIC AFTERCARE: Primary | ICD-10-CM

## 2025-08-21 DIAGNOSIS — M25.511 CHRONIC RIGHT SHOULDER PAIN: ICD-10-CM

## 2025-08-21 DIAGNOSIS — G89.29 CHRONIC RIGHT SHOULDER PAIN: ICD-10-CM

## 2025-08-26 ENCOUNTER — TREATMENT (OUTPATIENT)
Dept: PHYSICAL THERAPY | Facility: CLINIC | Age: 35
End: 2025-08-26
Payer: COMMERCIAL

## 2025-08-26 DIAGNOSIS — G89.29 CHRONIC RIGHT SHOULDER PAIN: ICD-10-CM

## 2025-08-26 DIAGNOSIS — Z47.89 ORTHOPEDIC AFTERCARE: Primary | ICD-10-CM

## 2025-08-26 DIAGNOSIS — M25.511 CHRONIC RIGHT SHOULDER PAIN: ICD-10-CM

## (undated) DEVICE — DRAPE,U/ SHT,SPLIT,PLAS,STERIL: Brand: MEDLINE

## (undated) DEVICE — 3M™ STERI-STRIP™ COMPOUND BENZOIN TINCTURE 40 BAGS/CARTON 4 CARTONS/CASE C1544: Brand: 3M™ STERI-STRIP™

## (undated) DEVICE — 3M(TM) TEGADERM(TM) TRANSPARENT FILM DRESSING FRAME STYLE 1627: Brand: 3M™ TEGADERM™

## (undated) DEVICE — 3M™ STERI-STRIP™ REINFORCED ADHESIVE SKIN CLOSURES, R1547, 1/2 IN X 4 IN (12 MM X 100 MM), 6 STRIPS/ENVELOPE: Brand: 3M™ STERI-STRIP™

## (undated) DEVICE — GLV SURG BIOGEL LTX PF 6 1/2

## (undated) DEVICE — APPL CHLORAPREP W/TINT 26ML ORNG

## (undated) DEVICE — GLV SURG BIOGEL LTX PF 6

## (undated) DEVICE — PATIENT RETURN ELECTRODE, SINGLE-USE, CONTACT QUALITY MONITORING, ADULT, WITH 9FT CORD, FOR PATIENTS WEIGING OVER 33LBS. (15KG): Brand: MEGADYNE

## (undated) DEVICE — GLV SURG SENSICARE PI MIC PF SZ7 LF STRL

## (undated) DEVICE — ANTIBACTERIAL UNDYED BRAIDED (POLYGLACTIN 910), SYNTHETIC ABSORBABLE SUTURE: Brand: COATED VICRYL

## (undated) DEVICE — DRSNG WND GZ CURAD OIL EMULSION 3X3IN STRL

## (undated) DEVICE — POSTN ARMSLV LAT/TRACTION DISP

## (undated) DEVICE — GLV SURG TRIUMPH CLASSIC PF LTX 7 STRL

## (undated) DEVICE — BLD SHAVER BONECUTTER 4MM 13CM

## (undated) DEVICE — SUT GUT CHRM 0 CT 27IN 914H

## (undated) DEVICE — KT ART BLD GAS QUICK DRAW

## (undated) DEVICE — GLV SURG BIOGEL LTX PF 8 1/2

## (undated) DEVICE — SUT ETHLN 3/0 PS1 18IN 1663H

## (undated) DEVICE — SUT VIC 2/0 CT2 27IN J269H

## (undated) DEVICE — CANN 5.5 WO/HOLES LTX FREE ORANGE

## (undated) DEVICE — SUTURELASSO STR 90D  AR406890

## (undated) DEVICE — GOWN,NON-REINFORCED,SIRUS,SET IN SLV,XXL: Brand: MEDLINE

## (undated) DEVICE — SKIN PREP TRAY W/CHG: Brand: MEDLINE INDUSTRIES, INC.

## (undated) DEVICE — REAMR PILOTED HD 7MM

## (undated) DEVICE — KENDALL SCD EXPRESS SLEEVES, KNEE LENGTH, MEDIUM: Brand: KENDALL SCD

## (undated) DEVICE — IMMOB SHLDR PAD2 UNIV SM

## (undated) DEVICE — PK ARTHSCP SHLDR TOWER 40

## (undated) DEVICE — PREP SOL POVIDONE/IODINE BT 4OZ

## (undated) DEVICE — SUT VIC 4/0 KS 27IN J662H

## (undated) DEVICE — ADHS SKIN DERMABOND TOP ADVANCED

## (undated) DEVICE — SOL ISO/ALC 70PCT 4OZ

## (undated) DEVICE — TUBING SET, GRAVITY, 4-SPIKE

## (undated) DEVICE — TUBING, SUCTION, 1/4" X 20', STRAIGHT: Brand: MEDLINE INDUSTRIES, INC.

## (undated) DEVICE — NDL BLNT 18G 1 1/2IN

## (undated) DEVICE — GOWN,SIRUS,NONRNF,SETINSLV,2XL,18/CS: Brand: MEDLINE

## (undated) DEVICE — BUR SHAVER CLEARCUT 12FLUT 5MM 13CM

## (undated) DEVICE — GLV SURG SENSICARE PI PF LF 7 GRN STRL

## (undated) DEVICE — ABL ASP APOLLO RF XL 90D

## (undated) DEVICE — SOL IRR NACL 0.9PCT BT 1000ML

## (undated) DEVICE — SUT VIC 3/0 CTI 36IN J944H

## (undated) DEVICE — SOL ISO/ALC RUB 70PCT 4OZ

## (undated) DEVICE — CANN TRPL DAM 7X7MM NO VLV

## (undated) DEVICE — GLV SURG TRIUMPH CLASSIC PF LTX 8.5 STRL

## (undated) DEVICE — ABL ASP APOLLORF I90 90DEG

## (undated) DEVICE — SUT ETHLN 4/0 PS2 PLSTC 1667G

## (undated) DEVICE — SUT VIC 0 CT1 36IN J946H

## (undated) DEVICE — APPL CHLORAPREP HI/LITE 26ML ORNG

## (undated) DEVICE — TUBING SET, GRAVITY, 4-SPIKE: Brand: CONMED

## (undated) DEVICE — GLV SURG SIGNATURE ESSENTIAL PF LTX SZ8.5